# Patient Record
Sex: FEMALE | Race: WHITE | URBAN - METROPOLITAN AREA
[De-identification: names, ages, dates, MRNs, and addresses within clinical notes are randomized per-mention and may not be internally consistent; named-entity substitution may affect disease eponyms.]

---

## 2019-06-27 ENCOUNTER — OFFICE VISIT (OUTPATIENT)
Dept: DERMATOLOGY | Age: 39
End: 2019-06-27

## 2019-06-27 VITALS
WEIGHT: 151 LBS | HEART RATE: 77 BPM | BODY MASS INDEX: 23.7 KG/M2 | OXYGEN SATURATION: 97 % | HEIGHT: 67 IN | DIASTOLIC BLOOD PRESSURE: 74 MMHG | SYSTOLIC BLOOD PRESSURE: 109 MMHG

## 2019-06-27 DIAGNOSIS — L40.9 PSORIASIS: Primary | ICD-10-CM

## 2019-06-27 PROCEDURE — 99202 OFFICE O/P NEW SF 15 MIN: CPT | Performed by: DERMATOLOGY

## 2019-06-27 PROCEDURE — 11104 PUNCH BX SKIN SINGLE LESION: CPT | Performed by: DERMATOLOGY

## 2019-06-27 RX ORDER — FLUCONAZOLE 150 MG/1
150 TABLET ORAL ONCE
COMMUNITY
End: 2019-08-02 | Stop reason: ALTCHOICE

## 2019-06-27 RX ORDER — CLOTRIMAZOLE 1 %
CREAM (GRAM) TOPICAL 2 TIMES DAILY
COMMUNITY
End: 2019-08-02 | Stop reason: ALTCHOICE

## 2019-06-27 RX ORDER — HYDROXYZINE HYDROCHLORIDE 10 MG/1
10 TABLET, FILM COATED ORAL 3 TIMES DAILY PRN
COMMUNITY
End: 2020-04-09 | Stop reason: SDUPTHER

## 2019-06-27 RX ORDER — FLUOXETINE 10 MG/1
10 TABLET, FILM COATED ORAL DAILY
COMMUNITY
End: 2020-04-09 | Stop reason: ALTCHOICE

## 2019-06-27 RX ORDER — PANTOPRAZOLE SODIUM 40 MG/1
40 TABLET, DELAYED RELEASE ORAL DAILY
COMMUNITY
End: 2021-03-26 | Stop reason: ALTCHOICE

## 2019-06-27 RX ORDER — LIDOCAINE HYDROCHLORIDE AND EPINEPHRINE 10; 10 MG/ML; UG/ML
0.5 INJECTION, SOLUTION INFILTRATION; PERINEURAL ONCE
Status: COMPLETED | OUTPATIENT
Start: 2019-06-27 | End: 2019-06-27

## 2019-06-27 RX ADMIN — LIDOCAINE HYDROCHLORIDE AND EPINEPHRINE 0.5 ML: 10; 10 INJECTION, SOLUTION INFILTRATION; PERINEURAL at 14:57

## 2019-06-27 NOTE — PROGRESS NOTES
 Alcohol use: Not on file       REVIEW OF SYSTEMS:  Review of Systems  Skin: Denies any new changing, growing orbleeding lesions or rashes except as described in the HPI   Constitutional: Denies fevers, chills, and malaise. PHYSICAL EXAM:   /74 (Site: Right Upper Arm, Position: Sitting, Cuff Size: Medium Adult)   Pulse 77   Ht 5' 7\" (1.702 m)   Wt 151 lb (68.5 kg)   SpO2 97%   BMI 23.65 kg/m²     General Exam:  General Appearance: No acute distress, Well nourished     Neuro: Alert and oriented to person, place and time  Psych: Normal affect   Lymph Node: Not performed    Cutaneous Exam: Performed as documented in clinic note below. Total body skin exam, including head/face, neck, both arms, chest, back, abdomen, both legs, buttocks, digits and/or nails, was examined. Genital exam was deferred as patient denied having any lesions in this area. Pertinent Physical Exam Findings:  Physical Exam  Annular eroded psoriatic plaques of R>L LE and left arm, 15% BSA    Photo surveillance performed: Yes    Medical Necessity of Exam Performed:   Widespread Rash    Additional Diagnostic Testing performed during exam: Not performed ,  Not performed    ASSESSMENT:   Diagnosis Orders   1. Psoriasis  MS PUNCH BIOPSY SKIN SINGLE LESION    Surgical Pathology    lidocaine-EPINEPHrine 1 percent-[id number] injection 0.5 mL    triamcinolone (KENALOG) 0.1 % ointment       Plan of Action is as Follows:  Assessment   1. Psoriasis vs. Tinea vs. Other annular eruption. Very eroded but I suspect this is d/t recent extensive excoriations, no evidence of infection today. Patient will call if develops s/s of infection  Punch Biopsy: After cleaning with alcohol the rash was anesthetized with 1% lidocaine with epinephrine and a 4 mm punch was performed. Hemostasis was achieved with suture closure of the defect with 4-0 Ethilon and Vaseline and a bandage were applied.   Denies joint pain  - MS PUNCH BIOPSY SKIN SINGLE LESION  - person must inherit the genes that cause it. Types of psoriasis If you have psoriasis, you will have one or more of these types: Plaque (also called psoriasis vulgaris). Guttate. Inverse (also called flexural psoriasis or intertriginous psoriasis). Pustular. Erythrodermic (also called exfoliative psoriasis). Some people get more than one type. Sometimes a person gets one type of psoriasis, and then the type of psoriasis changes. Signs and symptoms:  What you see and feel depends on the type of psoriasis you have. You may have just a few of the signs and symptoms listed below, or you may have many. Plaque psoriasis  (also called psoriasis vulgaris)  Raised, reddish patches on the skin called plaque (plak). Patches may be covered with a silvery-white coating, which dermatologists call scale. Patches can appear anywhere on the skin. Most patches appear on the knees, elbows, lower back, and scalp. Patches can itch. Scratching the itchy patches often causes the patches to thicken. Patches vary in size and can appear as separate patches or join together to cover a large area. Nail problems -- pits in the nails, crumbling nail, nail falls off. What you see and feel depends on the type of psoriasis you have. You may have just a few of the signs and symptoms listed below, or you may have many. Plaque psoriasis (also called psoriasis vulgaris) Raised, reddish patches on the skin called plaque (plak). Patches may be covered with a silvery-white coating, which dermatologists call scale. Patches can appear anywhere on the skin. Most patches appear on the knees, elbows, lower back, and scalp. Patches can itch. Scratching the itchy patches often causes the patches to thicken. Patches vary in size and can appear as separate patches or join together to cover a large area. Nail problems -- pits in the nails, crumbling nail, nail falls off.     Guttate psoriasis  Small, red spots (usually on the trunk, arms, and legs but can appear on the scalp, face, and ears). Spots can show up all over the skin. Spots often appear after an illness, especially strep throat. Spots may clear up in a few weeks or months without treatment. Spots may appear where the person had plaque psoriasis. Pustular psoriasis  Skin red, swollen, and dotted with pus-filled bumps. Bumps usually appear only on the palms and soles. Soreness and pain where the bumps appear. Pus-filled bumps will dry, and leave behind brown dots and/or scale on the skin. When pus-filled bumps cover the body, the person also may have:  Guttate psoriasis Small, red spots (usually on the trunk, arms, and legs but can appear on the scalp, face, and ears). Spots can show up all over the skin. Spots often appear after an illness, especially strep throat. Spots may clear up in a few weeks or months without treatment. Spots may appear where the person had plaque psoriasis. Pustular psoriasis Skin red, swollen, and dotted with pus-filled bumps. Bumps usually appear only on the palms and soles. Soreness and pain where the bumps appear. Pus-filled bumps will dry, and leave behind brown dots and/or scale on the skin. When pus-filled bumps cover the body, the person also may have:  Bright-red skin. Been feeling sick and exhausted. Fever. Chills. Severe itching. Rapid pulse. Loss of appetite. Muscle weakness. Inverse psoriasis   (also called flexural psoriasis or intertriginous psoriasis)Inverse psoriasis (also called flexural psoriasis or intertriginous psoriasis)  Smooth, red patches of skin that look raw. Patches only develop where skin touches skin, such as the armpits, around the groin, genitals, and buttocks. Women can develop a red, raw patch under their breasts. Skin feels very sore where inverse psoriasis appears. Erythrodermic psoriasis  (also called exfoliative psoriasis)  Skin looks like it is burned.    Most (or all) of the skin on the body turns manage the disease, make informed decisions about how you treat psoriasis, and avoid things that can make psoriasis worse. It will also help you talk about psoriasis with others. Take good care of yourself. Eating a healthy diet, exercising, not smoking, and drinking very little alcohol will help. Smoking, drinking, and being overweight make psoriasis worse. These also can make treatment less effective. People who have psoriasis also have an increased risk for developing heart disease, diabetes, and other diseases, so taking good care of yourself is essential.  Be aware of your joints. If your joints feel stiff and sore, especially when you wake up, see a dermatologist. Stiff or sore joints can be the first sign of psoriatic (sore-EE-at-ic) arthritis. About 10% to 30% of people who have psoriasis get this type of arthritis. Treatment is essential. This type of arthritis can eat away the joints. Treatment can prevent deformed joints and disability. Notice your nails. If your nails begin to pull away from the nail bed or develop pitting, ridges, or a yellowish-orange color, see a dermatologist. These are signs of psoriatic arthritis. Pay attention to your mood. If you feel depressed, you may want to join a psoriasis support group or see a mental health professional. Depression, anxiety, and suicidal behavior are more common in people who have psoriasis. Getting help is not a sign of weakness. Learn about treatment for psoriasis. Some people choose not to treat psoriasis, but it is important to know your options. This will help you make an informed decision and feel in control. Tell your dermatologist if you cannot afford the medicine. You may be eligible for financial assistance. To learn more about this assistance, visit Financial Assistance Available for Psoriasis Medication. Talk with your dermatologist before you stop taking medicine for psoriasis.  Immediately stopping a medicine for psoriasis can have serious consequences. It can cause one type of psoriasis to turn into another, more serious type of psoriasis. Lets say a person who has plaque psoriasis takes a medicine called methotrexate. If the person just stops taking methotrexate, this can cause the plaque psoriasis to turn into guttate psoriasis or erythrodermic psoriasis. This can be very serious. Tips for managing Psoriasis:It is a long-lasting disease. Here are some things you can do that will help you take control. Learn about psoriasis. Knowledge really is power. Learning about psoriasis will help you manage the disease, make informed decisions about how you treat psoriasis, and avoid things that can make psoriasis worse. It will also help you talk about psoriasis with others. Take good care of yourself. Eating a healthy diet, exercising, not smoking, and drinking very little alcohol will help. Smoking, drinking, and being overweight make psoriasis worse. These also can make treatment less effective. People who have psoriasis also have an increased risk for developing heart disease, diabetes, and other diseases, so taking good care of yourself is essential. Be aware of your joints. If your joints feel stiff and sore, especially when you wake up, see a dermatologist. Stiff or sore joints can be the first sign of psoriatic (sore-EE-at-ic) arthritis. About 10% to 30% of people who have psoriasis get this type of arthritis. Treatment is essential. This type of arthritis can eat away the joints. Treatment can prevent deformed joints and disability. Notice your nails. If your nails begin to pull away from the nail bed or develop pitting, ridges, or a yellowish-orange color, see a dermatologist. These are signs of psoriatic arthritis. Pay attention to your mood. If you feel depressed, you may want to join a psoriasis support group or see a mental health professional. Depression, anxiety, and suicidal behavior are more common in people who have psoriasis. Getting help is not a sign of weakness. Learn about treatment for psoriasis. Some people choose not to treat psoriasis, but it is important to know your options. This will help you make an informed decision and feel in control. Tell your dermatologist if you cannot afford the medicine. You may be eligible for financial assistance. To learn more about this assistance, visit Financial Assistance Available for Psoriasis Medication. Talk with your dermatologist before you stop taking medicine for psoriasis. Immediately stopping a medicine for psoriasis can have serious consequences. It can cause one type of psoriasis to turn into another, more serious type of psoriasis. Lets say a person who has plaque psoriasis takes a medicine called methotrexate. If the person just stops taking methotrexate, this can cause the plaque psoriasis to turn into guttate psoriasis or erythrodermic psoriasis. This can be very serious. [address] [place] [address] Dermatology                   Follow-up: No follow-ups on file. This note was created with the assistance of a speech-recognition program.  Although the intention is to generate a document that actually reflects the content of the visit, no guarantees can be provided that every mistake has been identified and corrected byediting.     Electronically signed by Milton Mcdowell MD on 6/27/19 at 1:34 PM

## 2019-06-27 NOTE — PATIENT INSTRUCTIONS
1) Apply Triamcinolone cream twice a day. BIOPSY WOUND CARE    A biopsy is where a small piece of skin tissue is removed and examined by a pathologist.  When a biopsy is done, there is a small wound site that requires proper care to prevent infection and scarring. Some biopsies require sutures and their removal.    How to Care for Biopsy Wound    A.  Leave band-aid or dressing on for 24 hours. B. Wash two times a day with soap and water. C.  Let the wound air dry, then apply Vaseline ointment and cover with a Band-Aid       unless otherwise instructed by your provider. D. If there is slight discomfort, you may give acetaminophen or ibuprofen. When To Call the Doctor    Call the Dermatology Clinic or your doctor if any of the following occur:    A. Redness and swelling  B. Tenderness and warm to touch  C.  Drainage from wound  D. Fever    Biopsy Results    Biopsy results are usually available in 1-2 weeks. We provide biopsy results in letters for begin results or we will call for any concerning results. If you have not heard from our staff please call the office within 2 weeks. Please call our office with any concerns at [phone]. Psoriasis    What is psoriasis? Psoriasis (sore-EYE-ah-sis) is a chronic (long-lasting) disease. It develops when a persons immune system sends faulty signals that tell skin cells to grow too quickly. New skin cells form in days rather than weeks. The body does not shed these excess skin cells. The skin cells pile up on the surface of the skin, causing patches of psoriasis to appear. Psoriasis may look contagious, but it's not. You cannot get psoriasis from touching someone who has it. To get psoriasis, a person must inherit the genes that cause it. Types of psoriasis If you have psoriasis, you will have one or more of these types: Plaque (also called psoriasis vulgaris). Guttate. Inverse (also called flexural psoriasis or intertriginous psoriasis). Pustular. Erythrodermic (also called exfoliative psoriasis). Some people get more than one type. Sometimes a person gets one type of psoriasis, and then the type of psoriasis changes. Signs and symptoms:  What you see and feel depends on the type of psoriasis you have. You may have just a few of the signs and symptoms listed below, or you may have many. Plaque psoriasis  (also called psoriasis vulgaris)  Raised, reddish patches on the skin called plaque (plak). Patches may be covered with a silvery-white coating, which dermatologists call scale. Patches can appear anywhere on the skin. Most patches appear on the knees, elbows, lower back, and scalp. Patches can itch. Scratching the itchy patches often causes the patches to thicken. Patches vary in size and can appear as separate patches or join together to cover a large area. Nail problems -- pits in the nails, crumbling nail, nail falls off. What you see and feel depends on the type of psoriasis you have. You may have just a few of the signs and symptoms listed below, or you may have many. Plaque psoriasis (also called psoriasis vulgaris) Raised, reddish patches on the skin called plaque (plak). Patches may be covered with a silvery-white coating, which dermatologists call scale. Patches can appear anywhere on the skin. Most patches appear on the knees, elbows, lower back, and scalp. Patches can itch. Scratching the itchy patches often causes the patches to thicken. Patches vary in size and can appear as separate patches or join together to cover a large area. Nail problems -- pits in the nails, crumbling nail, nail falls off. Guttate psoriasis  Small, red spots (usually on the trunk, arms, and legs but can appear on the scalp, face, and ears). Spots can show up all over the skin. Spots often appear after an illness, especially strep throat. Spots may clear up in a few weeks or months without treatment.    Spots may appear where the person had plaque psoriasis. Pustular psoriasis  Skin red, swollen, and dotted with pus-filled bumps. Bumps usually appear only on the palms and soles. Soreness and pain where the bumps appear. Pus-filled bumps will dry, and leave behind brown dots and/or scale on the skin. When pus-filled bumps cover the body, the person also may have:  Guttate psoriasis Small, red spots (usually on the trunk, arms, and legs but can appear on the scalp, face, and ears). Spots can show up all over the skin. Spots often appear after an illness, especially strep throat. Spots may clear up in a few weeks or months without treatment. Spots may appear where the person had plaque psoriasis. Pustular psoriasis Skin red, swollen, and dotted with pus-filled bumps. Bumps usually appear only on the palms and soles. Soreness and pain where the bumps appear. Pus-filled bumps will dry, and leave behind brown dots and/or scale on the skin. When pus-filled bumps cover the body, the person also may have:  Bright-red skin. Been feeling sick and exhausted. Fever. Chills. Severe itching. Rapid pulse. Loss of appetite. Muscle weakness. Inverse psoriasis   (also called flexural psoriasis or intertriginous psoriasis)Inverse psoriasis (also called flexural psoriasis or intertriginous psoriasis)  Smooth, red patches of skin that look raw. Patches only develop where skin touches skin, such as the armpits, around the groin, genitals, and buttocks. Women can develop a red, raw patch under their breasts. Skin feels very sore where inverse psoriasis appears. Erythrodermic psoriasis  (also called exfoliative psoriasis)  Skin looks like it is burned. Most (or all) of the skin on the body turns bright red. Body cannot maintain its normal temperature of 98.6° F. Person gets very hot or very cold. Heart beats too fast.   Intense itching. Intense pain. Smooth, red patches of skin that look raw.  Patches only develop where skin touches skin, such as the armpits, around the groin, genitals, and buttocks. Women can develop a red, raw patch under their breasts. Skin feels very sore where inverse psoriasis appears. Erythrodermic psoriasis (also called exfoliative psoriasis) Skin looks like it is burned. Most (or all) of the skin on the body turns bright red. Body cannot maintain its normal temperature of 98.6° F. Person gets very hot or very cold. Heart beats too fast. Intense itching. Intense pain. If it looks like a person has erythrodermic psoriasis, get the person to a hospital right away. The persons life may be in danger. Who gets psoriasis? People who get psoriasis usually have one or more person in their family who has psoriasis. Not everyone who has a family member with psoriasis will get psoriasis. But psoriasis is common. In the [place], about 7.5 million people have psoriasis. Most people, about 80%, have plaque psoriasis. Psoriasis can begin at any age. Most people get psoriasis between 13and 27years of age. By age 36, most people who will get psoriasis, about 75%, have psoriasis. Another common time for psoriasis to begin is between 48and 61years of age. Whites get psoriasis more often than other races. Infants and young children are more likely to get inverse psoriasis and guttate psoriasis. What causes psoriasis? Scientists are still trying to learn everything that happens inside the body to cause psoriasis. We know that psoriasis is not contagious. You cannot get psoriasis from swimming in the same pool or having sex. Scientists have learned that a persons immune system and genes play important roles. It seems that many genes must interact to cause psoriasis. Scientists also know that not everyone who inherits the genes for psoriasis will get psoriasis. It seems that a person must inherit the right mix of genes. Then the person must be exposed to a trigger.  Many people say that their psoriasis began after they experienced one of these common psoriasis triggers: A stressful event. Strep throat. Taking certain medicines, such as lithium, or medicine to prevent malaria. Cold, dry weather. A cut, scratch, or bad sunburn. How does a dermatologist diagnose psoriasis? To diagnose psoriasis, a dermatologist:  Examines a patients skin, nails, and scalp for signs of psoriasis. Asks whether family members have psoriasis. Learns about what has been happening in the patients life. A dermatologist may want to know whether a patient has been under a lot of stress, had a recent illness, or just started taking a medicine. Sometimes a dermatologist also removes a bit of skin. A dermatologist may call this confirming the diagnosis. By looking at the removed skin under a microscope, one can confirm whether a person has psoriasis. How do dermatologists treat psoriasis? Treating psoriasis has benefits. Treatment can reduce signs and symptoms of psoriasis, which usually makes a person feel better. With treatment, some people see their skin completely clear. Treatment can even improve a person's quality of life. Thanks to ongoing research, there are many treatments for psoriasis. It is important to work with a dermatologist to find treatment that works for you and fits your lifestyle. Every treatment has benefits, drawbacks, and possible side effects. Outcome  Psoriasis is a chronic (long-lasting) disease of the immune system. It cannot be cured. This means that most people have psoriasis for life. By teaming up with a dermatologist who treats psoriasis, you can find a treatment plan that works for you. Dermatologists encourage their patients who have psoriasis to take an active role in managing this disease. By taking an active role, you can reduce the effects that psoriasis has on your quality of life. How does a dermatologist diagnose psoriasis?  To diagnose psoriasis, a dermatologist: Examines a patients skin, nails, and scalp for signs of psoriasis. Asks whether family members have psoriasis. Learns about what has been happening in the patients life. A dermatologist may want to know whether a patient has been under a lot of stress, had a recent illness, or just started taking a medicine. Sometimes a dermatologist also removes a bit of skin. A dermatologist may call this confirming the diagnosis. By looking at the removed skin under a microscope, one can confirm whether a person has psoriasis. How do dermatologists treat psoriasis? Treating psoriasis has benefits. Treatment can reduce signs and symptoms of psoriasis, which usually makes a person feel better. With treatment, some people see their skin completely clear. Treatment can even improve a person's quality of life. Thanks to ongoing research, there are many treatments for psoriasis. It is important to work with a dermatologist to find treatment that works for you and fits your lifestyle. Every treatment has benefits, drawbacks, and possible side effects. Outcome Psoriasis is a chronic (long-lasting) disease of the immune system. It cannot be cured. This means that most people have psoriasis for life. By teaming up with a dermatologist who treats psoriasis, you can find a treatment plan that works for you. Dermatologists encourage their patients who have psoriasis to take an active role in managing this disease. By taking an active role, you can reduce the effects that psoriasis has on your quality of life. Psoriasis: Tips for managing  Psoriasis is a long-lasting disease. Here are some things you can do that will help you take control. Learn about psoriasis. Knowledge really is power. Learning about psoriasis will help you manage the disease, make informed decisions about how you treat psoriasis, and avoid things that can make psoriasis worse. It will also help you talk about psoriasis with others. Take good care of yourself.  Eating a healthy diet, exercising, not smoking, and plaque psoriasis to turn into guttate psoriasis or erythrodermic psoriasis. This can be very serious. Tips for managing Psoriasis:It is a long-lasting disease. Here are some things you can do that will help you take control. Learn about psoriasis. Knowledge really is power. Learning about psoriasis will help you manage the disease, make informed decisions about how you treat psoriasis, and avoid things that can make psoriasis worse. It will also help you talk about psoriasis with others. Take good care of yourself. Eating a healthy diet, exercising, not smoking, and drinking very little alcohol will help. Smoking, drinking, and being overweight make psoriasis worse. These also can make treatment less effective. People who have psoriasis also have an increased risk for developing heart disease, diabetes, and other diseases, so taking good care of yourself is essential. Be aware of your joints. If your joints feel stiff and sore, especially when you wake up, see a dermatologist. Stiff or sore joints can be the first sign of psoriatic (sore-EE-at-ic) arthritis. About 10% to 30% of people who have psoriasis get this type of arthritis. Treatment is essential. This type of arthritis can eat away the joints. Treatment can prevent deformed joints and disability. Notice your nails. If your nails begin to pull away from the nail bed or develop pitting, ridges, or a yellowish-orange color, see a dermatologist. These are signs of psoriatic arthritis. Pay attention to your mood. If you feel depressed, you may want to join a psoriasis support group or see a mental health professional. Depression, anxiety, and suicidal behavior are more common in people who have psoriasis. Getting help is not a sign of weakness. Learn about treatment for psoriasis. Some people choose not to treat psoriasis, but it is important to know your options. This will help you make an informed decision and feel in control.  Tell your dermatologist if you cannot afford the medicine. You may be eligible for financial assistance. To learn more about this assistance, visit Financial Assistance Available for Psoriasis Medication. Talk with your dermatologist before you stop taking medicine for psoriasis. Immediately stopping a medicine for psoriasis can have serious consequences. It can cause one type of psoriasis to turn into another, more serious type of psoriasis. Lets say a person who has plaque psoriasis takes a medicine called methotrexate. If the person just stops taking methotrexate, this can cause the plaque psoriasis to turn into guttate psoriasis or erythrodermic psoriasis. This can be very serious.           [id number] [employer]

## 2019-07-02 DIAGNOSIS — L40.9 PSORIASIS: ICD-10-CM

## 2019-07-15 ENCOUNTER — TELEPHONE (OUTPATIENT)
Dept: DERMATOLOGY | Age: 39
End: 2019-07-15

## 2019-08-02 ENCOUNTER — OFFICE VISIT (OUTPATIENT)
Dept: DERMATOLOGY | Age: 39
End: 2019-08-02

## 2019-08-02 VITALS
SYSTOLIC BLOOD PRESSURE: 129 MMHG | HEART RATE: 116 BPM | BODY MASS INDEX: 23.78 KG/M2 | DIASTOLIC BLOOD PRESSURE: 92 MMHG | OXYGEN SATURATION: 97 % | HEIGHT: 66 IN | WEIGHT: 148 LBS

## 2019-08-02 DIAGNOSIS — L30.8 OTHER SPECIFIED DERMATITIS: ICD-10-CM

## 2019-08-02 DIAGNOSIS — L30.2 ID REACTION: ICD-10-CM

## 2019-08-02 DIAGNOSIS — L81.0 POST-INFLAMMATORY HYPERPIGMENTATION: Primary | ICD-10-CM

## 2019-08-02 PROCEDURE — 99213 OFFICE O/P EST LOW 20 MIN: CPT | Performed by: DERMATOLOGY

## 2019-08-02 RX ORDER — NORETHINDRONE ACETATE AND ETHINYL ESTRADIOL AND FERROUS FUMARATE 1MG-20(21)
KIT ORAL
Refills: 0 | COMMUNITY
Start: 2019-07-15 | End: 2021-07-09 | Stop reason: SDUPTHER

## 2019-08-02 NOTE — PROGRESS NOTES
Dermatology Patient Note  [facility] PHYS[facility]ANS  [facility]  [address] [address]  Dept: [phone]  Dept Fax: [phone]      VISITDATE: 8/2/2019   REFERRING PROVIDER: No ref. provider found      Linda Cox is a 34 y.o. female  who presents today in the office for:    Follow-up (triamcinolone did help legs, but now she is breaking out on backs of hands and her face)      HISTORY OF PRESENT ILLNESS:  Patients presents for f/u rash, psoriasis vs. eczema  Interval history: legs much improved with triamcinolone, but still have pink patches. Has new bumps on her dorsal hands, but not itchy  Patient assessment of progress: near complete and near adequate  Current treatment and adherence: triamcinolone BID to legs and hands  Prior treatments tried: see initial HPI        CURRENT MEDICATIONS:   Current Outpatient Medications   Medication Sig Dispense Refill    triamcinolone (KENALOG) 0.1 % ointment Apply to rash twice daily (not face, armpit or groin) 454 g 1    FLUoxetine (PROZAC) 10 MG tablet Take 10 mg by mouth daily      hydrOXYzine (ATARAX) 10 MG tablet Take 10 mg by mouth 3 times daily as needed for Itching (anxiety)       pantoprazole (PROTONIX) 40 MG tablet Take 40 mg by mouth daily      JUNEL FE 1/20 1-20 MG-MCG per tablet TAKE 1 TABLET BY MOUTH EVERY DAY  0     No current facility-administered medications for this visit.         ALLERGIES:   Allergies   Allergen Reactions    Erythromycin      As a child        SOCIAL HISTORY:  Social History     Tobacco Use    Smoking status: Current Every Day Smoker     Packs/day: 0.05     Years: 20.00     Pack years: 1.00     Types: Cigarettes    Smokeless tobacco: Never Used    Tobacco comment: down to 1 cig daily   Substance Use Topics    Alcohol use: Not on file       REVIEW OF SYSTEMS:  Review of Systems  Skin: Denies any new changing, growing orbleeding lesions or rashes except as described in the HPI   Constitutional: Denies fevers, chills, and malaise. PHYSICAL EXAM:   BP (!) 129/92 (Site: Left Upper Arm, Position: Sitting, Cuff Size: Medium Adult)   Pulse 116   Ht 5' 6\" (1.676 m)   Wt 148 lb (67.1 kg)   SpO2 97%   BMI 23.89 kg/m²     General Exam:  General Appearance: No acute distress, Well nourished     Neuro: Alert and oriented to person, place and time  Psych: Normal affect   Lymph Node: Not performed    Cutaneous Exam: Performed as documented in clinic note below. Head/face,neck, both arms, digits and/or nails, and limited lower extremities (that which is visible with pants/shorts and shoes/socks on) was examined. Pertinent Physical Exam Findings:  Physical Exam  Legs completely clear with just pink patches remaining  Dorsal hands and digits with pink papules  Face with acneiform papules    Photo surveillance performed: No    Medical Necessity of Exam Performed:   Distribution of patient concerns    Additional Diagnostic Testing performed during exam: Not performed ,  Not performed    ASSESSMENT:   Diagnosis Orders   1. Post-inflammatory hyperpigmentation     2. Id reaction     3. 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 Post-inflammatory hyperpigmentation  - complete clearance of rash on legs with triamcinolone  - biopsy showed spong derm with eos and vesicle formation, consietnt with eczema or contact derm, however morphology and color suggested psoriasis. Patient adamantly denies any new exposure on her legs  - patient will call if recurs    2. Id reaction  - suspect papular eruption on dorsal hands is id  - apply triamcinolone twice daily until clear    RTC prn            Patient Instructions   Use the Triamcinolone on the hands. If the rash comes back on the legs, continue using the Triamcinolone. Call if the rash returns and take a picture.     Sun Protection     There are two types of sun rays that are harmful to the skin. UVA rays cause skin aging and skin cancer, such as melanoma. UVB rays cause sunburns, cataracts, and also contribute to skin cancer. The American-Academy of Dermatology recommends that children and adults wear a broad spectrum, waterproof sunscreen with a Sun Protection Factor (SPF) of 30 or higher. It is important to check the ingredient label to be sure the sunscreen will protect the skin from both UVA and UVB sunrays. Your sunscreen should contain at least one of the following ingredients: titanium dioxide, zinc oxide, or avobenzone. Sunscreen will not be effective unless it is applied to all exposed skin. Sunscreens work best if they are applied 30 minutes before sun exposure. They should be reapplied every 2 hours and after any water exposure. Sunscreen is not perfect. It is important to use other methods to protect the skin from sun exposure also. Wear hats, sunglasses and other sun protective clothing when outdoors. Stay in the shade during the peak hours of sun exposure between 10 AM and 4 PM.        Follow-up: No follow-ups on file. This note was created with the assistance of a speech-recognition program.  Although the intention is to generate a document that actually reflects the content of the visit, no guarantees can be provided that every mistake has been identified and corrected byediting.     Electronically signed by Javier Suazo MD on 8/2/19 at 2:41 PM

## 2019-08-07 ENCOUNTER — TELEPHONE (OUTPATIENT)
Dept: DERMATOLOGY | Age: 39
End: 2019-08-07

## 2020-04-09 ENCOUNTER — OFFICE VISIT (OUTPATIENT)
Dept: FAMILY MEDICINE CLINIC | Age: 40
End: 2020-04-09

## 2020-04-09 VITALS
BODY MASS INDEX: 26.21 KG/M2 | HEIGHT: 67 IN | DIASTOLIC BLOOD PRESSURE: 84 MMHG | SYSTOLIC BLOOD PRESSURE: 120 MMHG | TEMPERATURE: 98.1 F | OXYGEN SATURATION: 97 % | HEART RATE: 78 BPM | WEIGHT: 167 LBS

## 2020-04-09 PROBLEM — F32.9 MAJOR DEPRESSIVE DISORDER WITH CURRENT ACTIVE EPISODE: Status: ACTIVE | Noted: 2020-04-09

## 2020-04-09 PROBLEM — L30.9 ECZEMA: Status: ACTIVE | Noted: 2020-04-09

## 2020-04-09 PROBLEM — F41.9 ANXIETY: Status: ACTIVE | Noted: 2020-04-09

## 2020-04-09 PROBLEM — A60.00 RECURRENT GENITAL HERPES SIMPLEX TYPE 2 INFECTION: Status: ACTIVE | Noted: 2020-04-09

## 2020-04-09 PROBLEM — G47.00 INSOMNIA: Status: ACTIVE | Noted: 2020-04-09

## 2020-04-09 PROBLEM — Z87.11 HISTORY OF GASTRIC ULCER: Status: ACTIVE | Noted: 2020-04-09

## 2020-04-09 PROCEDURE — G0444 DEPRESSION SCREEN ANNUAL: HCPCS | Performed by: NURSE PRACTITIONER

## 2020-04-09 PROCEDURE — G8431 POS CLIN DEPRES SCRN F/U DOC: HCPCS | Performed by: NURSE PRACTITIONER

## 2020-04-09 PROCEDURE — 99204 OFFICE O/P NEW MOD 45 MIN: CPT | Performed by: NURSE PRACTITIONER

## 2020-04-09 RX ORDER — VENLAFAXINE HYDROCHLORIDE 150 MG/1
150 CAPSULE, EXTENDED RELEASE ORAL DAILY
Qty: 90 CAPSULE | Refills: 1 | Status: SHIPPED | OUTPATIENT
Start: 2020-04-09 | End: 2020-10-26

## 2020-04-09 RX ORDER — ALPRAZOLAM 0.5 MG/1
0.5 TABLET ORAL DAILY
COMMUNITY
End: 2020-04-09 | Stop reason: ALTCHOICE

## 2020-04-09 RX ORDER — HYDROXYZINE 50 MG/1
50 TABLET, FILM COATED ORAL NIGHTLY
Qty: 30 TABLET | Refills: 0 | Status: SHIPPED | OUTPATIENT
Start: 2020-04-09 | End: 2020-04-09

## 2020-04-09 RX ORDER — ZOLPIDEM TARTRATE 12.5 MG/1
12.5 TABLET, FILM COATED, EXTENDED RELEASE ORAL NIGHTLY PRN
COMMUNITY
End: 2020-04-09 | Stop reason: ALTCHOICE

## 2020-04-09 RX ORDER — HYDROXYZINE HYDROCHLORIDE 10 MG/1
50 TABLET, FILM COATED ORAL NIGHTLY PRN
Qty: 90 TABLET | Refills: 1 | Status: SHIPPED
Start: 2020-04-09 | End: 2020-04-09 | Stop reason: CLARIF

## 2020-04-09 RX ORDER — HYDROXYZINE 50 MG/1
50 TABLET, FILM COATED ORAL NIGHTLY
Qty: 90 TABLET | Refills: 1 | Status: SHIPPED | OUTPATIENT
Start: 2020-04-09 | End: 2020-10-14

## 2020-04-09 RX ORDER — VALACYCLOVIR HYDROCHLORIDE 1 G/1
1000 TABLET, FILM COATED ORAL DAILY
Qty: 30 TABLET | Refills: 1 | Status: SHIPPED | OUTPATIENT
Start: 2020-04-09 | End: 2020-06-19

## 2020-04-09 RX ORDER — VENLAFAXINE HYDROCHLORIDE 75 MG/1
75 CAPSULE, EXTENDED RELEASE ORAL DAILY
COMMUNITY
End: 2020-04-09 | Stop reason: SDUPTHER

## 2020-04-09 RX ORDER — VALACYCLOVIR HYDROCHLORIDE 500 MG/1
500 TABLET, FILM COATED ORAL DAILY
COMMUNITY
End: 2020-04-09

## 2020-04-09 RX ORDER — BUSPIRONE HYDROCHLORIDE 5 MG/1
5 TABLET ORAL 3 TIMES DAILY
Qty: 90 TABLET | Refills: 0 | Status: SHIPPED | OUTPATIENT
Start: 2020-04-09 | End: 2020-08-04

## 2020-04-09 ASSESSMENT — ENCOUNTER SYMPTOMS
SORE THROAT: 0
DIARRHEA: 0
SHORTNESS OF BREATH: 0
COUGH: 0
RHINORRHEA: 0
ABDOMINAL DISTENTION: 0
BACK PAIN: 0
NAUSEA: 0
VOMITING: 0
ABDOMINAL PAIN: 0
CHEST TIGHTNESS: 0
CONSTIPATION: 0

## 2020-04-09 ASSESSMENT — PATIENT HEALTH QUESTIONNAIRE - PHQ9
SUM OF ALL RESPONSES TO PHQ QUESTIONS 1-9: 14
8. MOVING OR SPEAKING SO SLOWLY THAT OTHER PEOPLE COULD HAVE NOTICED. OR THE OPPOSITE, BEING SO FIGETY OR RESTLESS THAT YOU HAVE BEEN MOVING AROUND A LOT MORE THAN USUAL: 0
SUM OF ALL RESPONSES TO PHQ9 QUESTIONS 1 & 2: 3
4. FEELING TIRED OR HAVING LITTLE ENERGY: 3
1. LITTLE INTEREST OR PLEASURE IN DOING THINGS: 1
10. IF YOU CHECKED OFF ANY PROBLEMS, HOW DIFFICULT HAVE THESE PROBLEMS MADE IT FOR YOU TO DO YOUR WORK, TAKE CARE OF THINGS AT HOME, OR GET ALONG WITH OTHER PEOPLE: 1
6. FEELING BAD ABOUT YOURSELF - OR THAT YOU ARE A FAILURE OR HAVE LET YOURSELF OR YOUR FAMILY DOWN: 0
2. FEELING DOWN, DEPRESSED OR HOPELESS: 2
3. TROUBLE FALLING OR STAYING ASLEEP: 3
SUM OF ALL RESPONSES TO PHQ QUESTIONS 1-9: 14
9. THOUGHTS THAT YOU WOULD BE BETTER OFF DEAD, OR OF HURTING YOURSELF: 0
5. POOR APPETITE OR OVEREATING: 2
7. TROUBLE CONCENTRATING ON THINGS, SUCH AS READING THE NEWSPAPER OR WATCHING TELEVISION: 3

## 2020-04-09 NOTE — PROGRESS NOTES
Amor Dent, APRN-CNP  [facility]  [address] [address], [address]  [address] [address]  Dept: [phone]  Dept Fax: [phone]       Patient ID: Doc Ramirez is a 37 y.o. female. HPI    Doc Ramirez is a 37 y.o. female who presents to the office today for a first visit and to establish a relationship with a new primary care physician. Today, the patient complains of increased anxiety and depression. She recently moved here from [place] to be with her family in [place]. 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 Pt denies any fever or chills. Pt today denies any HA, chest pain, or SOB. Pt denies any N/V/D/C or abdominal pain. She does have a history of eczema. She presents today with a generalized itchy rash. She did see dermatologist Angie Ibrahim in June of 2019 for similar complaints. Per patient, her last physician thought she had scabies. She was treated multiple times and it never went away. Upon presentation to the dermatologist, she underwent a biopsy which was compatible with an eczematous process. She denies any major health concerns. The patient's past medical, surgical, social, and family history as well as her current medications and allergies were reviewed as documented in today's encounter. Current Outpatient Medications on File Prior to Visit   Medication Sig Dispense Refill    JUNEL FE 1/20 1-20 MG-MCG per tablet TAKE 1 TABLET BY MOUTH EVERY DAY  0    pantoprazole (PROTONIX) 40 MG tablet Take 40 mg by mouth daily       No current facility-administered medications on file prior to visit. Subjective:     Preventative care, female:  Last colonoscopy:  No   Last mammogram: no   Sexually active: no   Contraception: no    Last PAP:  No, been a couple of years.     Nicotine use: vapes  Alcohol use: socially  Drug use:  no      Review of Systems

## 2020-06-19 RX ORDER — VALACYCLOVIR HYDROCHLORIDE 1 G/1
TABLET, FILM COATED ORAL
Qty: 30 TABLET | Refills: 0 | Status: SHIPPED | OUTPATIENT
Start: 2020-06-19 | End: 2020-08-04

## 2020-08-04 RX ORDER — VALACYCLOVIR HYDROCHLORIDE 1 G/1
TABLET, FILM COATED ORAL
Qty: 90 TABLET | Refills: 0 | Status: SHIPPED | OUTPATIENT
Start: 2020-08-04 | End: 2020-09-24

## 2020-08-04 RX ORDER — BUSPIRONE HYDROCHLORIDE 5 MG/1
TABLET ORAL
Qty: 90 TABLET | Refills: 0 | Status: SHIPPED | OUTPATIENT
Start: 2020-08-04 | End: 2021-03-26 | Stop reason: ALTCHOICE

## 2020-09-24 RX ORDER — VALACYCLOVIR HYDROCHLORIDE 1 G/1
TABLET, FILM COATED ORAL
Qty: 90 TABLET | Refills: 0 | Status: SHIPPED | OUTPATIENT
Start: 2020-09-24 | End: 2021-03-22

## 2020-10-26 RX ORDER — VENLAFAXINE HYDROCHLORIDE 150 MG/1
CAPSULE, EXTENDED RELEASE ORAL
Qty: 30 CAPSULE | Refills: 0 | Status: SHIPPED | OUTPATIENT
Start: 2020-10-26 | End: 2020-11-20

## 2020-11-20 RX ORDER — VENLAFAXINE HYDROCHLORIDE 150 MG/1
CAPSULE, EXTENDED RELEASE ORAL
Qty: 90 CAPSULE | Refills: 3 | Status: SHIPPED | OUTPATIENT
Start: 2020-11-20 | End: 2021-08-20

## 2021-01-07 LAB
ALBUMIN SERPL-MCNC: 4.3 G/DL
ALP BLD-CCNC: 72 U/L
ALT SERPL-CCNC: 16 U/L
ANION GAP SERPL CALCULATED.3IONS-SCNC: NORMAL MMOL/L
AST SERPL-CCNC: 24 U/L
AVERAGE GLUCOSE: 105
BASOPHILS ABSOLUTE: NORMAL
BASOPHILS RELATIVE PERCENT: NORMAL
BILIRUB SERPL-MCNC: 0.3 MG/DL (ref 0.1–1.4)
BUN BLDV-MCNC: 7 MG/DL
CALCIUM SERPL-MCNC: 8.8 MG/DL
CHLORIDE BLD-SCNC: 104 MMOL/L
CHOLESTEROL, TOTAL: 124 MG/DL
CHOLESTEROL/HDL RATIO: 2.8
CO2: 24 MMOL/L
CREAT SERPL-MCNC: 0.61 MG/DL
EOSINOPHILS ABSOLUTE: NORMAL
EOSINOPHILS RELATIVE PERCENT: NORMAL
GFR CALCULATED: NORMAL
GLUCOSE BLD-MCNC: 106 MG/DL
HBA1C MFR BLD: 5.3 %
HCT VFR BLD CALC: 39 % (ref 36–46)
HDLC SERPL-MCNC: 45 MG/DL (ref 35–70)
HEMOGLOBIN: 13 G/DL (ref 12–16)
LDL CHOLESTEROL CALCULATED: 56 MG/DL (ref 0–160)
LYMPHOCYTES ABSOLUTE: NORMAL
LYMPHOCYTES RELATIVE PERCENT: NORMAL
MCH RBC QN AUTO: NORMAL PG
MCHC RBC AUTO-ENTMCNC: NORMAL G/DL
MCV RBC AUTO: NORMAL FL
MONOCYTES ABSOLUTE: NORMAL
MONOCYTES RELATIVE PERCENT: NORMAL
NEUTROPHILS ABSOLUTE: NORMAL
NEUTROPHILS RELATIVE PERCENT: NORMAL
NONHDLC SERPL-MCNC: NORMAL MG/DL
PLATELET # BLD: 346 K/ΜL
PMV BLD AUTO: NORMAL FL
POTASSIUM SERPL-SCNC: 4 MMOL/L
RBC # BLD: 4.21 10^6/ΜL
SODIUM BLD-SCNC: 139 MMOL/L
TOTAL PROTEIN: 6.9
TRIGL SERPL-MCNC: 117 MG/DL
VLDLC SERPL CALC-MCNC: 23 MG/DL
WBC # BLD: 5.04 10^3/ML

## 2021-01-13 DIAGNOSIS — Z00.00 ANNUAL VISIT FOR GENERAL ADULT MEDICAL EXAMINATION WITHOUT ABNORMAL FINDINGS: ICD-10-CM

## 2021-03-06 DIAGNOSIS — G47.00 INSOMNIA, UNSPECIFIED TYPE: ICD-10-CM

## 2021-03-08 RX ORDER — HYDROXYZINE 50 MG/1
TABLET, FILM COATED ORAL
Qty: 30 TABLET | Refills: 0 | Status: SHIPPED | OUTPATIENT
Start: 2021-03-08 | End: 2021-03-26 | Stop reason: ALTCHOICE

## 2021-03-22 DIAGNOSIS — A60.00 RECURRENT GENITAL HERPES SIMPLEX TYPE 2 INFECTION: ICD-10-CM

## 2021-03-22 RX ORDER — VALACYCLOVIR HYDROCHLORIDE 1 G/1
TABLET, FILM COATED ORAL
Qty: 30 TABLET | Refills: 0 | Status: SHIPPED | OUTPATIENT
Start: 2021-03-22 | End: 2021-07-09 | Stop reason: SDUPTHER

## 2021-03-22 NOTE — TELEPHONE ENCOUNTER
Last 4/9/20  Next None     Patient had an appointment with Dr Marton Galeazzi on 12/2/20 and no showed the appointment.

## 2021-08-20 ENCOUNTER — OFFICE VISIT (OUTPATIENT)
Dept: FAMILY MEDICINE CLINIC | Age: 41
End: 2021-08-20

## 2021-08-20 VITALS
OXYGEN SATURATION: 97 % | TEMPERATURE: 97.3 F | SYSTOLIC BLOOD PRESSURE: 118 MMHG | DIASTOLIC BLOOD PRESSURE: 86 MMHG | HEART RATE: 102 BPM | BODY MASS INDEX: 25.37 KG/M2 | WEIGHT: 162 LBS

## 2021-08-20 DIAGNOSIS — E78.5 DYSLIPIDEMIA: ICD-10-CM

## 2021-08-20 DIAGNOSIS — R73.9 HYPERGLYCEMIA: ICD-10-CM

## 2021-08-20 DIAGNOSIS — Z87.891 PERSONAL HISTORY OF TOBACCO USE, PRESENTING HAZARDS TO HEALTH: ICD-10-CM

## 2021-08-20 DIAGNOSIS — F40.10 SOCIAL ANXIETY DISORDER: ICD-10-CM

## 2021-08-20 DIAGNOSIS — F41.9 ANXIETY: ICD-10-CM

## 2021-08-20 DIAGNOSIS — Z71.3 DIETARY COUNSELING: ICD-10-CM

## 2021-08-20 DIAGNOSIS — Z59.86: ICD-10-CM

## 2021-08-20 DIAGNOSIS — Z76.89 ESTABLISHING CARE WITH NEW DOCTOR, ENCOUNTER FOR: Primary | ICD-10-CM

## 2021-08-20 PROCEDURE — G8417 CALC BMI ABV UP PARAM F/U: HCPCS | Performed by: FAMILY MEDICINE

## 2021-08-20 PROCEDURE — 99204 OFFICE O/P NEW MOD 45 MIN: CPT | Performed by: FAMILY MEDICINE

## 2021-08-20 PROCEDURE — 99406 BEHAV CHNG SMOKING 3-10 MIN: CPT | Performed by: FAMILY MEDICINE

## 2021-08-20 RX ORDER — MIRTAZAPINE 15 MG/1
15 TABLET, FILM COATED ORAL NIGHTLY
Qty: 30 TABLET | Refills: 1 | Status: SHIPPED | OUTPATIENT
Start: 2021-08-20 | End: 2021-10-29

## 2021-08-20 RX ORDER — PROPRANOLOL HYDROCHLORIDE 10 MG/1
10 TABLET ORAL 4 TIMES DAILY
Qty: 120 TABLET | Refills: 1 | Status: SHIPPED | OUTPATIENT
Start: 2021-08-20 | End: 2021-10-29 | Stop reason: SDUPTHER

## 2021-08-20 SDOH — ECONOMIC STABILITY: FOOD INSECURITY: WITHIN THE PAST 12 MONTHS, THE FOOD YOU BOUGHT JUST DIDN'T LAST AND YOU DIDN'T HAVE MONEY TO GET MORE.: NEVER TRUE

## 2021-08-20 SDOH — ECONOMIC STABILITY - INCOME SECURITY: FINANCIAL INSECURITY: Z59.86

## 2021-08-20 SDOH — ECONOMIC STABILITY: FOOD INSECURITY: WITHIN THE PAST 12 MONTHS, YOU WORRIED THAT YOUR FOOD WOULD RUN OUT BEFORE YOU GOT MONEY TO BUY MORE.: NEVER TRUE

## 2021-08-20 ASSESSMENT — PATIENT HEALTH QUESTIONNAIRE - PHQ9
SUM OF ALL RESPONSES TO PHQ QUESTIONS 1-9: 0
SUM OF ALL RESPONSES TO PHQ QUESTIONS 1-9: 0
2. FEELING DOWN, DEPRESSED OR HOPELESS: 0
SUM OF ALL RESPONSES TO PHQ9 QUESTIONS 1 & 2: 0
SUM OF ALL RESPONSES TO PHQ QUESTIONS 1-9: 0
1. LITTLE INTEREST OR PLEASURE IN DOING THINGS: 0

## 2021-08-20 ASSESSMENT — SOCIAL DETERMINANTS OF HEALTH (SDOH): HOW HARD IS IT FOR YOU TO PAY FOR THE VERY BASICS LIKE FOOD, HOUSING, MEDICAL CARE, AND HEATING?: NOT HARD AT ALL

## 2021-08-20 NOTE — PROGRESS NOTES
Progress Note    Parish Valdez is a 41 y.o.  female who presents today alone for evaluation of   Chief Complaint   Patient presents with    New Patient    Anxiety           HPI:   Patient is here to [facility] care today. Patient PMH GERD, HSV, mood disorder, and anxiety. Patient PSH wisdom tooth extraction. Patient fhx mom DM, dad prostate cancer, and brother DM. Patient is a current everyday tobacco smoker. Patient denies regular EtOH consumption. Patient denies illicits. Patient admits to some depression and anxiety. Patient states she has been bothered by her mood for the last ten years. Patient denies specific diet. Patient denies regular aerobic activity weekly. Patient denies prior mammogram. Patient is due for PAP. Patient states she will go to [facility].     PHQ-9 Total Score: 0 (8/20/2021  2:31 PM)    Patient states she has suffered from anxiety and panic attacks. Patient states effexor does help somewhat. Patient states she gets very anxious and gets shaky at work. Patient states she has difficulty falling asleep. Patient states she is able to stay asleep. Patient reports excessive sweating. Patient is a tobacco smoker.     Health Maintenance Due   Topic Date Due    Pneumococcal 0-64 years Vaccine (1 of 2 - PPSV23) Never done    COVID-19 Vaccine (1) Never done    Cervical cancer screen  Never done        Current Medications:     Current Outpatient Medications   Medication Sig Dispense Refill    mirtazapine (REMERON) 15 MG tablet Take 1 tablet by mouth nightly 30 tablet 1    propranolol (INDERAL) 10 MG tablet Take 1 tablet by mouth 4 times daily 120 tablet 1    JUNEL FE 1/20 1-20 MG-MCG per tablet TAKE 1 TABLET BY MOUTH EVERY DAY 28 tablet 11    valACYclovir (VALTREX) 1 g tablet Take 1 tablet by mouth daily 30 tablet 11    venlafaxine (EFFEXOR XR) 150 MG extended release capsule Take 2 capsules by mouth daily 60 capsule 11    pantoprazole (PROTONIX) 40 MG tablet Take 1 tablet by mouth every morning (before breakfast) 30 tablet 11     No current facility-administered medications for this visit. Allergies: Allergies   Allergen Reactions    Erythromycin      As a child     Red Dye         Medical History:     Past Medical History:   Diagnosis Date    Anxiety     Depression     Eczema     Herpes     Stomach ulcer        Past Surgical History:   Procedure Laterality Date    WISDOM TOOTH EXTRACTION         Family History   Problem Relation Age of Onset    Diabetes Mother     Cancer Father         prostate    Diabetes Brother         Social History:     Social History     Socioeconomic History    Marital status: Single     Spouse name: Not on file    Number of children: Not on file    Years of education: Not on file    Highest education level: Not on file   Occupational History    Not on file   Tobacco Use    Smoking status: Current Every Day Smoker     Packs/day: 0.25     Years: 20.00     Pack years: 5.00     Types: Cigarettes     Start date: 4/9/1996    Smokeless tobacco: Never Used    Tobacco comment: down to 3 cig daily   Vaping Use    Vaping Use: Former    Start date: 4/9/2020    Substances: Unknown   Substance and Sexual Activity    Alcohol use: Yes     Comment: Socially     Drug use: Never    Sexual activity: Not Currently   Other Topics Concern    Not on file   Social History Narrative    Not on file     Social Determinants of Health     Financial Resource Strain: Low Risk     Difficulty of Paying Living Expenses: Not hard at all   Food Insecurity: No Food Insecurity    Worried About Running Out of Food in the Last Year: Never true    Maik of Food in the Last Year: Never true   Transportation Needs:     Lack of Transportation (Medical):      Lack of Transportation (Non-Medical):    Physical Activity:     Days of Exercise per Week:     Minutes of Exercise per Session:    Stress:     Feeling of Stress :    Social Connections:     Frequency of Communication with Friends and Family:     Frequency of Social Gatherings with Friends and Family:     Attends Hindu Services:     Active Member of Clubs or Organizations:     Attends Club or Organization Meetings:     Marital Status:    Intimate Partner Violence:     Fear of Current or Ex-Partner:     Emotionally Abused:     Physically Abused:     Sexually Abused:         ROS:     Constitutional: No fevers, chills, fatigue. +anxiety/panic  ENT: No nasal congestion or sore throat  Respiratory: No difficulty in breathing or cough. Cardiovascular: No chest pain, palpitations or shortness of breath  Gastrointestinal: No abdominal pain or change in bowel movements. Genitourinary: No change in urinary frequency or dysuria. Skin: No rashes or skin lesions. +excessive sweating  Neurological: No weakness. No headaches. Last Filed Vitals:  /86 (Site: Left Upper Arm, Position: Sitting, Cuff Size: Medium Adult)   Pulse 102   Temp 97.3 °F (36.3 °C) (Temporal)   Wt 162 lb (73.5 kg)   SpO2 97%   BMI 25.37 kg/m²      Physical Examination:     GENERAL APPEARANCE: in no acute distress, well developed, well nourished. HEAD: normocephalic, atraumatic. EYES: extraocular movement intact (EOMI), pupils equal, round, reactive to light and accommodation. EARS: normal, tympanic membrane intact, clear, auditory canal clear. NOSE: nares patent, no erythema, sinuses nontender bilaterally, no rhinorrhea. ORAL CAVITY: mucosa moist, no lesions. THROAT: clear, no mass, no exudate. NECK/THYROID: neck supple, full range of motion, no thyromegaly. HEART: no murmurs, regular rate and rhythm, S1, S2 normal.   LUNGS: clear to auscultation bilaterally, no wheezes, rales, rhonchi.    ABDOMEN: normal, bowel sounds present, soft, nontender, nondistended, no rebound guarding or rigidity    Recent Labs/ In Office Testing/ Radiograph review:     Orders Only on 01/13/2021   Component Date Value Ref Range Status    Cholesterol, Total 01/07/2021 124  mg/dL Final    HDL 01/07/2021 45  35 - 70 mg/dL Final    LDL Calculated 01/07/2021 56  0 - 160 mg/dL Final    Triglycerides 01/07/2021 117  mg/dL Final    Chol/HDL Ratio 01/07/2021 2.8   Final    VLDL 01/07/2021 23  mg/dL Final    Sodium 01/07/2021 139  mmol/L Final    Chloride 01/07/2021 104  mmol/L Final    Potassium 01/07/2021 4.0  mmol/L Final    BUN 01/07/2021 7  mg/dL Final    CREATININE 01/07/2021 0.61   Final    Glucose 01/07/2021 106  mg/dL Final    AST 01/07/2021 24  U/L Final    ALT 01/07/2021 16  U/L Final    Calcium 01/07/2021 8.8  mg/dL Final    Total Protein 01/07/2021 6.9   Final    CO2 01/07/2021 24  mmol/L Final    Albumin 01/07/2021 4.3   Final    Alkaline Phosphatase 01/07/2021 72  U/L Final    Total Bilirubin 01/07/2021 0.3  0.1 - 1.4 mg/dL Final    WBC 01/07/2021 5.04  10^3/mL Final    Hemoglobin 01/07/2021 13.0  12.0 - 16.0 g/dL Final    Hematocrit 01/07/2021 39.0  36 - 46 % Final    Platelets 66/29/7817 346  K/µL Final    RBC 01/07/2021 4.21  10^6/µL Final    Hemoglobin A1C 01/07/2021 5.3  % Final    AVERAGE GLUCOSE 01/07/2021 105   Final       No results found for this visit on 08/20/21. Assessment/Plan:     Baljinder Jewell was seen today for new patient and anxiety. Diagnoses and all orders for this visit:    Establishing care with new doctor, encounter for    BMI 25.0-25.9,adult    Dyslipidemia  -     ALT; Future  -     AST; Future  -     CBC Auto Differential; Future  -     Lipid Panel; Future  -     Magnesium; Future  -     Renal Function Panel; Future  -     TSH with Reflex; Future    Hyperglycemia  -     Hemoglobin A1C; Future    Anxiety  -     TSH with Reflex; Future  -     mirtazapine (REMERON) 15 MG tablet; Take 1 tablet by mouth nightly    Unable to afford copayment for visit  -     [facility] Referral for Social Determinants of Health    Social anxiety disorder  -     propranolol (INDERAL) 10 MG tablet;  Take 1 tablet by mouth 4 times daily    Dietary counseling  -      BMI ABOVE NORMAL F/U    Personal history of tobacco use, presenting hazards to health  -     NE TOBACCO USE CESSATION INTERMEDIATE 3-10 MINUTES [04715]    Follow up on labs. [facility] referral placed for self pay and help with mammogram copay. Strongly encouraged smoking cessation. Encouraged well balanced diet. Encouraged 150 mins of aerobic activity weekly. Start remeron at bedtime for sleep and anxiety. Start low dose B-blocker for sweating, shaking, and anxiety at work. All questions answered and addressed to patient satisfaction. Patient understands and agrees to the plan. The patient was evaluated and treated today based on the osteopathic principle that each person is a unit of body, mind, and spirit, the body is capable of self-regulation, self-healing, and health maintenance and that structure and function are reciprocally interrelated. Follow-up:   Return in about 6 weeks (around 10/1/2021) for anxiety; 20 min appt. Beulah Barnhart D.O.    BMI was elevated today, and weight loss plan recommended is : conventional weight loss. Tobacco Cessation Counseling: Patient advised about behavior change, including information about personal health harms, usage of appropriate cessation measures and benefits of cessation.   Time spent (minutes): 3-10

## 2021-08-20 NOTE — PATIENT INSTRUCTIONS
Learning About Obesity  What is obesity? Obesity means having an unhealthy amount of body fat. This puts your health in danger. It can lead to other health problems, such as type 2 diabetes and high blood pressure. How do you know if your weight is in the obesity range? To know if your weight is in the obesity range, your doctor looks at your body mass index (BMI) and waist size. BMI is a number that is calculated from your weight and your height. To figure out your BMI for yourself, you can use an online tool, such as http://www.askew.com/ on the [employer]. If your BMI is 30.0 or higher, it falls within the obesity range. Keep in mind that BMI and waist size are only guides. They are not tools to determine your ideal body weight. What causes obesity? When you take in more calories than you burn off, you gain weight. How you eat, how active you are, and other things affect how your body uses calories and whether you gain weight. If you have family members who have too much body fat, you may have inherited a tendency to gain weight. And your family also helps form your eating and lifestyle habits, which can lead to obesity. Also, our busy lives make it harder to plan and cook healthy meals. For many of us, it's easier to reach for prepared foods, go out to eat, or go to the drive-through. But these foods are often high in saturated fat and calories. Portions are often too large. What can you do to reach a healthy weight? Focus on health, not diets. Diets are hard to stay on and don't work in the long run. It is very hard to stay with a diet that includes lots of big changes in your eating habits. Instead of a diet, focus on lifestyle changes that will improve your health and achieve the right balance of energy and calories. To lose weight, you need to burn more calories than you take in.  You can do it by eating healthy foods in reasonable amounts and becoming more active, even a little bit every day. Making small changes over time can add up to a lot. Make a plan for change. Many people have found that naming their reasons for change and staying focused on their plan can make a big difference. Work with your doctor to create a plan that is right for you. · Ask yourself: Ivelisse Squibb are my personal, most powerful reasons for wanting this change? What will my life look like when I've made the change? \"  · Set your long-term goal. Make it specific, such as \"I will lose x pounds. \"  · Break your long-term goal into smaller, short-term goals. Make these small steps specific and within your reach, things you know you can do. These steps are what keep you going from day to day. Talk with your doctor about other weight-loss options. If you have a BMI in a certain range and have not been able to lose weight with diet and exercise, medicine or surgery may be an option for you. Before your doctor will prescribe medicines or surgery, he or she will probably want you to be more active and follow your healthy eating plan for a period of time. These habits are key lifelong changes for managing your weight, with or without other medical treatment. And these changes can help you avoid weight-related health problems. How can you stay on your plan for change? Be ready. Choose to start during a time when there are few events like holidays, social events, and high-stress periods. These events might trigger slip-ups. Decide on your first few steps. Most people have more success when they make small changes, one step at a time. For example, you might switch a daily candy bar to a piece of fruit, walk 10 minutes more, or add more vegetables to a meal.  Line up your support people. Make sure you're not going to be alone as you make this change. Connect with people who understand how important it is to you.  Ask family members and friends for help in keeping with your plan. And think about who could make it harder for you, and how to handle them. Try tracking. People who keep track of what they eat, feel, and do are better at losing weight. Try writing down things like:  · What and how much you eat. · How you feel before and after each meal.  · Details about each meal (like eating out or at home, eating alone, or with friends or family). · What you do to be active. Look and plan. As you track, look for patterns that you may want to change. Take note of:  · When you eat and whether you skip meals. · How often you eat out. · How many fruits and vegetables you eat. · When you eat beyond feeling full. · When and why you eat for reasons other than being hungry. When you stray from your plan, don't get upset. Figure out what made you slip up and how you can fix it. Can you take medicines or have surgery to lose weight? If you have a BMI in a certain range and have not been able to lose weight with diet and exercise, medicine or surgery may be an option for you. If you have a BMI of at least 30.0 (or a BMI of at least 27.0 and another health problem related to your weight), ask your doctor about weight-loss medicines. They work by making you feel less hungry, making you feel full more quickly, or changing how you digest fat. Medicines are used along with diet changes and more physical activity to help you make lasting changes. If you have a BMI of 40.0 or more (or a BMI of 35.0 or more and another health problem related to your weight), your doctor may talk with you about surgery. Weight-loss surgery has risks, and you will need to work with your doctor to compare the risk of having obesity with the risks of surgery. With any option you choose, you will still need to eat a healthy diet and get regular exercise. Follow-up care is a key part of your treatment and safety. Be sure to make and go to all appointments, and call your doctor if you are having problems. It's also a good idea to know your test results and keep a list of the medicines you take. Where can you learn more? Go to https://chpepiceweb.[email or web address] org and sign in to your whoplusyou account. Enter N111 in the [facility] box to learn more about \"Learning About Obesity. \"     If you do not have an account, please click on the \"Sign Up Now\" link. Current as of: March 17, 2021               Content Version: 12.9  © 2407-7148 [employer]. Care instructions adapted under license by [facility] ([facility]). If you have questions about a medical condition or this instruction, always ask your healthcare professional. [address] any warranty or liability for your use of this information. Stopping Smoking: Care Instructions  Your Care Instructions     Cigarette smokers crave the nicotine in cigarettes. Giving it up is much harder than simply changing a habit. Your body has to stop craving the nicotine. It is hard to quit, but you can do it. There are many tools that people use to quit smoking. You may find that combining tools works best for you. There are several steps to quitting. First you get ready to quit. Then you get support to help you. After that, you learn new skills and behaviors to become a nonsmoker. For many people, a necessary step is getting and using medicine. Your doctor will help you set up the plan that best meets your needs. You may want to attend a smoking cessation program to help you quit smoking. When you choose a program, look for one that has proven success. Ask your doctor for ideas. You will greatly increase your chances of success if you take medicine as well as get counseling or join a cessation program.  Some of the changes you feel when you first quit tobacco are uncomfortable. Your body will miss the nicotine at first, and you may feel short-tempered and grumpy. You may have trouble sleeping or concentrating.  Medicine can help you deal with these symptoms. You may struggle with changing your smoking habits and rituals. The last step is the tricky one: Be prepared for the smoking urge to continue for a time. This is a lot to deal with, but keep at it. You will feel better. Follow-up care is a key part of your treatment and safety. Be sure to make and go to all appointments, and call your doctor if you are having problems. It's also a good idea to know your test results and keep a list of the medicines you take. How can you care for yourself at home? · Ask your family, friends, and coworkers for support. You have a better chance of quitting if you have help and support. · Join a support group, such as Nicotine Anonymous, for people who are trying to quit smoking. · Consider signing up for a smoking cessation program, such as the American Lung Association's Freedom from Smoking program.  · Get text messaging support. Go to the website at www.smokefree. gov to sign up for the [facility] program.  · Set a quit date. Pick your date carefully so that it is not right in the middle of a big deadline or stressful time. Once you quit, do not even take a puff. Get rid of all ashtrays and lighters after your last cigarette. Clean your house and your clothes so that they do not smell of smoke. · Learn how to be a nonsmoker. Think about ways you can avoid those things that make you reach for a cigarette. ? Avoid situations that put you at greatest risk for smoking. For some people, it is hard to have a drink with friends without smoking. For others, they might skip a coffee break with coworkers who smoke. ? Change your daily routine. Take a different route to work or eat a meal in a different place. · Cut down on stress. Calm yourself or release tension by doing an activity you enjoy, such as reading a book, taking a hot bath, or gardening.   · Talk to your doctor or pharmacist about nicotine replacement therapy, which replaces the nicotine in your body. You still get nicotine but you do not use tobacco. Nicotine replacement products help you slowly reduce the amount of nicotine you need. These products come in several forms, many of them available over-the-counter:  ? Nicotine patches  ? Nicotine gum and lozenges  ? Nicotine inhaler  · Ask your doctor about bupropion (Wellbutrin) or varenicline (Chantix), which are prescription medicines. They do not contain nicotine. They help you by reducing withdrawal symptoms, such as stress and anxiety. · Some people find hypnosis, acupuncture, and massage helpful for ending the smoking habit. · Eat a healthy diet and get regular exercise. Having healthy habits will help your body move past its craving for nicotine. · Be prepared to keep trying. Most people are not successful the first few times they try to quit. Do not get mad at yourself if you smoke again. Make a list of things you learned and think about when you want to try again, such as next week, next month, or next year. Where can you learn more? Go to https://Spotlight.fm.[employer]. org and sign in to your [employer] account. Enter O733 in the [employer] box to learn more about \"Stopping Smoking: Care Instructions. \"     If you do not have an account, please click on the \"Sign Up Now\" link. Current as of: February 11, 2021               Content Version: 12.9  © 8793-7395 Healthwise, Incorporated. Care instructions adapted under license by [facility] [address] If you have questions about a medical condition or this instruction, always ask your healthcare professional. [place] [address] any warranty or liability for your use of this information. Learning About Benefits From Quitting Smoking  How does quitting smoking make you healthier? If you're thinking about quitting smoking, you may have a few reasons to be smoke-free. Your health may be one of them.   · When you quit smoking, you lower your risks for cancer, lung disease, heart attack, stroke, blood vessel disease, and blindness from macular degeneration. · When you're smoke-free, you get sick less often, and you heal faster. You are less likely to get colds, flu, bronchitis, and pneumonia. · As a nonsmoker, you may find that your mood is better and you are less stressed. When and how will you feel healthier? Quitting has real health benefits that start from day 1 of being smoke-free. And the longer you stay smoke-free, the healthier you get and the better you feel. The first hours  · After just 20 minutes, your blood pressure and heart rate go down. That means there's less stress on your heart and blood vessels. · Within 12 hours, the level of carbon monoxide in your blood drops back to normal. That makes room for more oxygen. With more oxygen in your body, you may notice that you have more energy than when you smoked. After 2 weeks  · Your lungs start to work better. · Your risk of heart attack starts to drop. After 1 month  · When your lungs are clear, you cough less and breathe deeper, so it's easier to be active. · Your sense of taste and smell return. That means you can enjoy food more than you have since you started smoking. Over the years  · Over the years, your risks of heart disease, heart attack, and stroke are lower. · After 10 years, your risk of dying from lung cancer is cut by about half. And your risk for many other types of cancer is lower too. How would quitting help others in your life? When you quit smoking, you improve the health of everyone who now breathes in your smoke. · Their heart, lung, and cancer risks drop, much like yours. · They are sick less. For babies and small children, living smoke-free means they're less likely to have ear infections, pneumonia, and bronchitis. · If you're a woman who is or will be pregnant someday, quitting smoking means a healthier .   · Children who are close to you are less likely to become adult smokers. Where can you learn more? Go to https://chpepiceweb.healthAptible. org and sign in to your [employer] account. Enter [id number] [id number] [id number] in the [employer] box to learn more about \"Learning About Benefits From Quitting Smoking. \"     If you do not have an account, please click on the \"Sign Up Now\" link. Current as of: February 11, 2021               Content Version: 12.9  © 2006-2021 Healthwise, Incorporated. Care instructions adapted under license by [facility] [place] ([facility]). If you have questions about a medical condition or this instruction, always ask your healthcare professional. [place] [address] any warranty or liability for your use of this information.

## 2021-08-27 ENCOUNTER — TELEPHONE (OUTPATIENT)
Dept: FAMILY MEDICINE CLINIC | Age: 41
End: 2021-08-27

## 2021-08-31 NOTE — TELEPHONE ENCOUNTER
Patient agreeable to receiving community resource assistance, will call back in a few hours because she is at work. Has no health insurance.

## 2021-10-29 ENCOUNTER — OFFICE VISIT (OUTPATIENT)
Dept: FAMILY MEDICINE CLINIC | Age: 41
End: 2021-10-29

## 2021-10-29 VITALS
HEART RATE: 91 BPM | SYSTOLIC BLOOD PRESSURE: 110 MMHG | BODY MASS INDEX: 26 KG/M2 | WEIGHT: 166 LBS | OXYGEN SATURATION: 97 % | DIASTOLIC BLOOD PRESSURE: 74 MMHG

## 2021-10-29 DIAGNOSIS — F40.10 SOCIAL ANXIETY DISORDER: ICD-10-CM

## 2021-10-29 DIAGNOSIS — F41.9 ANXIETY: Primary | ICD-10-CM

## 2021-10-29 DIAGNOSIS — Z71.3 DIETARY COUNSELING: ICD-10-CM

## 2021-10-29 DIAGNOSIS — Z87.891 PERSONAL HISTORY OF TOBACCO USE, PRESENTING HAZARDS TO HEALTH: ICD-10-CM

## 2021-10-29 PROCEDURE — 99214 OFFICE O/P EST MOD 30 MIN: CPT | Performed by: FAMILY MEDICINE

## 2021-10-29 PROCEDURE — 99406 BEHAV CHNG SMOKING 3-10 MIN: CPT | Performed by: FAMILY MEDICINE

## 2021-10-29 PROCEDURE — G8417 CALC BMI ABV UP PARAM F/U: HCPCS | Performed by: FAMILY MEDICINE

## 2021-10-29 RX ORDER — PROPRANOLOL HYDROCHLORIDE 10 MG/1
10 TABLET ORAL 4 TIMES DAILY
Qty: 360 TABLET | Refills: 1 | Status: SHIPPED | OUTPATIENT
Start: 2021-10-29

## 2021-10-29 RX ORDER — MIRTAZAPINE 30 MG/1
30 TABLET, FILM COATED ORAL NIGHTLY
Qty: 90 TABLET | Refills: 0 | Status: SHIPPED | OUTPATIENT
Start: 2021-10-29 | End: 2022-04-13

## 2021-10-29 RX ORDER — VENLAFAXINE HYDROCHLORIDE 150 MG/1
300 CAPSULE, EXTENDED RELEASE ORAL DAILY
Qty: 180 CAPSULE | Refills: 1 | Status: SHIPPED | OUTPATIENT
Start: 2021-10-29 | End: 2022-05-09

## 2021-10-29 NOTE — PATIENT INSTRUCTIONS
Learning About Obesity  What is obesity? Obesity means having an unhealthy amount of body fat. This puts your health in danger. It can lead to other health problems, such as type 2 diabetes and high blood pressure. How do you know if your weight is in the obesity range? To know if your weight is in the obesity range, your doctor looks at your body mass index (BMI) and waist size. BMI is a number that is calculated from your weight and your height. To figure out your BMI for yourself, you can use an online tool, such as http://www.askew.com/ on the [employer] of [employer]. If your BMI is 30.0 or higher, it falls within the obesity range. Keep in mind that BMI and waist size are only guides. They are not tools to determine your ideal body weight. What causes obesity? When you take in more calories than you burn off, you gain weight. How you eat, how active you are, and other things affect how your body uses calories and whether you gain weight. If you have family members who have too much body fat, you may have inherited a tendency to gain weight. And your family also helps form your eating and lifestyle habits, which can lead to obesity. Also, our busy lives make it harder to plan and cook healthy meals. For many of us, it's easier to reach for prepared foods, go out to eat, or go to the drive-through. But these foods are often high in saturated fat and calories. Portions are often too large. What can you do to reach a healthy weight? Focus on health, not diets. Diets are hard to stay on and don't work in the long run. It is very hard to stay with a diet that includes lots of big changes in your eating habits. Instead of a diet, focus on lifestyle changes that will improve your health and achieve the right balance of energy and calories. To lose weight, you need to burn more calories than you take in.  You can do it by eating healthy foods in reasonable amounts and becoming more active, even a little bit every day. Making small changes over time can add up to a lot. Make a plan for change. Many people have found that naming their reasons for change and staying focused on their plan can make a big difference. Work with your doctor to create a plan that is right for you. · Ask yourself: Greta Spray are my personal, most powerful reasons for wanting this change? What will my life look like when I've made the change? \"  · Set your long-term goal. Make it specific, such as \"I will lose x pounds. \"  · Break your long-term goal into smaller, short-term goals. Make these small steps specific and within your reach, things you know you can do. These steps are what keep you going from day to day. Talk with your doctor about other weight-loss options. If you have a BMI in a certain range and have not been able to lose weight with diet and exercise, medicine or surgery may be an option for you. Before your doctor will prescribe medicines or surgery, he or she will probably want you to be more active and follow your healthy eating plan for a period of time. These habits are key lifelong changes for managing your weight, with or without other medical treatment. And these changes can help you avoid weight-related health problems. How can you stay on your plan for change? Be ready. Choose to start during a time when there are few events like holidays, social events, and high-stress periods. These events might trigger slip-ups. Decide on your first few steps. Most people have more success when they make small changes, one step at a time. For example, you might switch a daily candy bar to a piece of fruit, walk 10 minutes more, or add more vegetables to a meal.  Line up your support people. Make sure you're not going to be alone as you make this change. Connect with people who understand how important it is to you.  Ask family members and friends for help in keeping with your plan. And think about who could make it harder for you, and how to handle them. Try tracking. People who keep track of what they eat, feel, and do are better at losing weight. Try writing down things like:  · What and how much you eat. · How you feel before and after each meal.  · Details about each meal (like eating out or at home, eating alone, or with friends or family). · What you do to be active. Look and plan. As you track, look for patterns that you may want to change. Take note of:  · When you eat and whether you skip meals. · How often you eat out. · How many fruits and vegetables you eat. · When you eat beyond feeling full. · When and why you eat for reasons other than being hungry. When you stray from your plan, don't get upset. Figure out what made you slip up and how you can fix it. Can you take medicines or have surgery to lose weight? If you have a BMI in a certain range and have not been able to lose weight with diet and exercise, medicine or surgery may be an option for you. If you have a BMI of at least 30.0 (or a BMI of at least 27.0 and another health problem related to your weight), ask your doctor about weight-loss medicines. They work by making you feel less hungry, making you feel full more quickly, or changing how you digest fat. Medicines are used along with diet changes and more physical activity to help you make lasting changes. If you have a BMI of 40.0 or more (or a BMI of 35.0 or more and another health problem related to your weight), your doctor may talk with you about surgery. Weight-loss surgery has risks, and you will need to work with your doctor to compare the risk of having obesity with the risks of surgery. With any option you choose, you will still need to eat a healthy diet and get regular exercise. Follow-up care is a key part of your treatment and safety. Be sure to make and go to all appointments, and call your doctor if you are having problems. It's also a good idea to know your test results and keep a list of the medicines you take. Where can you learn more? Go to https://chpepiceweb.[email or web address]. org and sign in to your Sara Campbell account. Enter N111 in the [facility] box to learn more about \"Learning About Obesity. \"     If you do not have an account, please click on the \"Sign Up Now\" link. Current as of: March 17, 2021               Content Version: 13.0  © 5299-3431 [employer]. Care instructions adapted under license by [facility] ([facility]). If you have questions about a medical condition or this instruction, always ask your healthcare professional. [address] any warranty or liability for your use of this information. Stopping Smoking: Care Instructions  Your Care Instructions     Cigarette smokers crave the nicotine in cigarettes. Giving it up is much harder than simply changing a habit. Your body has to stop craving the nicotine. It is hard to quit, but you can do it. There are many tools that people use to quit smoking. You may find that combining tools works best for you. There are several steps to quitting. First you get ready to quit. Then you get support to help you. After that, you learn new skills and behaviors to become a nonsmoker. For many people, a necessary step is getting and using medicine. Your doctor will help you set up the plan that best meets your needs. You may want to attend a smoking cessation program to help you quit smoking. When you choose a program, look for one that has proven success. Ask your doctor for ideas. You will greatly increase your chances of success if you take medicine as well as get counseling or join a cessation program.  Some of the changes you feel when you first quit tobacco are uncomfortable. Your body will miss the nicotine at first, and you may feel short-tempered and grumpy. You may have trouble sleeping or concentrating.  Medicine can help you deal with these symptoms. You may struggle with changing your smoking habits and rituals. The last step is the tricky one: Be prepared for the smoking urge to continue for a time. This is a lot to deal with, but keep at it. You will feel better. Follow-up care is a key part of your treatment and safety. Be sure to make and go to all appointments, and call your doctor if you are having problems. It's also a good idea to know your test results and keep a list of the medicines you take. How can you care for yourself at home? · Ask your family, friends, and coworkers for support. You have a better chance of quitting if you have help and support. · Join a support group, such as Nicotine Anonymous, for people who are trying to quit smoking. · Consider signing up for a smoking cessation program, such as the American Lung Association's Freedom from Smoking program.  · Get text messaging support. Go to the website at www.smokefree. gov to sign up for the [facility] program.  · Set a quit date. Pick your date carefully so that it is not right in the middle of a big deadline or stressful time. Once you quit, do not even take a puff. Get rid of all ashtrays and lighters after your last cigarette. Clean your house and your clothes so that they do not smell of smoke. · Learn how to be a nonsmoker. Think about ways you can avoid those things that make you reach for a cigarette. ? Avoid situations that put you at greatest risk for smoking. For some people, it is hard to have a drink with friends without smoking. For others, they might skip a coffee break with coworkers who smoke. ? Change your daily routine. Take a different route to work or eat a meal in a different place. · Cut down on stress. Calm yourself or release tension by doing an activity you enjoy, such as reading a book, taking a hot bath, or gardening.   · Talk to your doctor or pharmacist about nicotine replacement therapy, which replaces the nicotine in your body. You still get nicotine but you do not use tobacco. Nicotine replacement products help you slowly reduce the amount of nicotine you need. These products come in several forms, many of them available over-the-counter:  ? Nicotine patches  ? Nicotine gum and lozenges  ? Nicotine inhaler  · Ask your doctor about bupropion (Wellbutrin) or varenicline (Chantix), which are prescription medicines. They do not contain nicotine. They help you by reducing withdrawal symptoms, such as stress and anxiety. · Some people find hypnosis, acupuncture, and massage helpful for ending the smoking habit. · Eat a healthy diet and get regular exercise. Having healthy habits will help your body move past its craving for nicotine. · Be prepared to keep trying. Most people are not successful the first few times they try to quit. Do not get mad at yourself if you smoke again. Make a list of things you learned and think about when you want to try again, such as next week, next month, or next year. Where can you learn more? Go to https://[email or web address].Celsion. org and sign in to your [employer] account. Enter X693 in the [employer] box to learn more about \"Stopping Smoking: Care Instructions. \"     If you do not have an account, please click on the \"Sign Up Now\" link. Current as of: February 11, 2021               Content Version: 13.0  © 3652-1618 Healthwise, Incorporated. Care instructions adapted under license by [facility] ([facility]). If you have questions about a medical condition or this instruction, always ask your healthcare professional. [place] [address] any warranty or liability for your use of this information. Learning About Benefits From Quitting Smoking  How does quitting smoking make you healthier? If you're thinking about quitting smoking, you may have a few reasons to be smoke-free. Your health may be one of them.   · When you quit smoking, you lower your risks for cancer, lung disease, heart attack, stroke, blood vessel disease, and blindness from macular degeneration. · When you're smoke-free, you get sick less often, and you heal faster. You are less likely to get colds, flu, bronchitis, and pneumonia. · As a nonsmoker, you may find that your mood is better and you are less stressed. When and how will you feel healthier? Quitting has real health benefits that start from day 1 of being smoke-free. And the longer you stay smoke-free, the healthier you get and the better you feel. The first hours  · After just 20 minutes, your blood pressure and heart rate go down. That means there's less stress on your heart and blood vessels. · Within 12 hours, the level of carbon monoxide in your blood drops back to normal. That makes room for more oxygen. With more oxygen in your body, you may notice that you have more energy than when you smoked. After 2 weeks  · Your lungs start to work better. · Your risk of heart attack starts to drop. After 1 month  · When your lungs are clear, you cough less and breathe deeper, so it's easier to be active. · Your sense of taste and smell return. That means you can enjoy food more than you have since you started smoking. Over the years  · Over the years, your risks of heart disease, heart attack, and stroke are lower. · After 10 years, your risk of dying from lung cancer is cut by about half. And your risk for many other types of cancer is lower too. How would quitting help others in your life? When you quit smoking, you improve the health of everyone who now breathes in your smoke. · Their heart, lung, and cancer risks drop, much like yours. · They are sick less. For babies and small children, living smoke-free means they're less likely to have ear infections, pneumonia, and bronchitis. · If you're a woman who is or will be pregnant someday, quitting smoking means a healthier .   · Children who are close to you are less likely to become adult smokers. Where can you learn more? Go to https://chpepiceweb.healthPoptip. org and sign in to your [employer] account. Enter [id number] in the [employer] box to learn more about \"Learning About Benefits From Quitting Smoking. \"     If you do not have an account, please click on the \"Sign Up Now\" link. Current as of: February 11, 2021               Content Version: 13.0  © 7804-9809 Healthwise, Incorporated. Care instructions adapted under license by [facility] ([facility]). If you have questions about a medical condition or this instruction, always ask your healthcare professional. [place] [address] any warranty or liability for your use of this information.

## 2021-10-29 NOTE — PROGRESS NOTES
Progress Note    Darshana Olguin is a 35 y.o.  female who presents today alone for evaluation of   Chief Complaint   Patient presents with    Anxiety           HPI:   Patient is here for follow up on anxiety. Patient was started on remeron for anxiety at last visit. Patient was also provided propranolol at last visit for social anxiety as needed. Patient is a tobacco smoker. Patient states propranolol is helping with social anxiety and tremor. Patient states remeron is helping her sleep. Patient states she has not noticed a significant difference in her mood. Patient denies SI/HI. Health Maintenance Due   Topic Date Due    Pneumococcal 0-64 years Vaccine (1 of 2 - PPSV23) Never done    COVID-19 Vaccine (1) Never done    Cervical cancer screen  Never done        Current Medications:     Current Outpatient Medications   Medication Sig Dispense Refill    venlafaxine (EFFEXOR XR) 150 MG extended release capsule Take 2 capsules by mouth daily 180 capsule 1    propranolol (INDERAL) 10 MG tablet Take 1 tablet by mouth 4 times daily 360 tablet 1    mirtazapine (REMERON) 30 MG tablet Take 1 tablet by mouth nightly 90 tablet 0    JUNEL FE 1/20 1-20 MG-MCG per tablet TAKE 1 TABLET BY MOUTH EVERY DAY 28 tablet 11    valACYclovir (VALTREX) 1 g tablet Take 1 tablet by mouth daily 30 tablet 11    pantoprazole (PROTONIX) 40 MG tablet Take 1 tablet by mouth every morning (before breakfast) 30 tablet 11     No current facility-administered medications for this visit. Allergies:      Allergies   Allergen Reactions    Erythromycin      As a child     Red Dye         Medical History:     Past Medical History:   Diagnosis Date    Anxiety     Depression     Eczema     Herpes     Stomach ulcer        Past Surgical History:   Procedure Laterality Date    WISDOM TOOTH EXTRACTION         Family History   Problem Relation Age of Onset    Diabetes Mother     Cancer Father         prostate    Diabetes Brother         Social History:     Social History     Socioeconomic History    Marital status: Single     Spouse name: Not on file    Number of children: Not on file    Years of education: Not on file    Highest education level: Not on file   Occupational History    Not on file   Tobacco Use    Smoking status: Current Every Day Smoker     Packs/day: 0.25     Years: 20.00     Pack years: 5.00     Types: Cigarettes     Start date: 4/9/1996    Smokeless tobacco: Never Used    Tobacco comment: down to 3 cig daily   Vaping Use    Vaping Use: Former    Start date: 4/9/2020    Substances: Unknown   Substance and Sexual Activity    Alcohol use: Yes     Comment: Socially     Drug use: Never    Sexual activity: Not Currently   Other Topics Concern    Not on file   Social History Narrative    Not on file     Social Determinants of Health     Financial Resource Strain: Low Risk     Difficulty of Paying Living Expenses: Not hard at all   Food Insecurity: No Food Insecurity    Worried About [address] in the Last Year: Never true    [facility] in the Last Year: Never true   Transportation Needs:     Lack of Transportation (Medical):  Lack of Transportation (Non-Medical):    Physical Activity:     Days of Exercise per Week:     Minutes of Exercise per Session:    Stress:     Feeling of Stress :    Social Connections:     Frequency of Communication with Friends and Family:     Frequency of Social Gatherings with Friends and Family:     Attends Scientology Services:     Active Member of Clubs or Organizations:     Attends Club or Organization Meetings:     Marital Status:    Intimate Partner Violence:     Fear of Current or Ex-Partner:     Emotionally Abused:     Physically Abused:     Sexually Abused:         ROS:     Constitutional: No fevers, chills, fatigue. +anxiety  ENT: No nasal congestion or sore throat  Respiratory: No difficulty in breathing or cough.    Cardiovascular: No  Albumin 01/07/2021 4.3   Final    Alkaline Phosphatase 01/07/2021 72  U/L Final    Total Bilirubin 01/07/2021 0.3  0.1 - 1.4 mg/dL Final    WBC 01/07/2021 5.04  10^3/mL Final    Hemoglobin 01/07/2021 13.0  12.0 - 16.0 g/dL Final    Hematocrit 01/07/2021 39.0  36 - 46 % Final    Platelets 99/30/5253 346  K/µL Final    RBC 01/07/2021 4.21  10^6/µL Final    Hemoglobin A1C 01/07/2021 5.3  % Final    AVERAGE GLUCOSE 01/07/2021 105   Final       No results found for this visit on 10/29/21. Assessment/Plan:     Pio Mccain was seen today for anxiety. Diagnoses and all orders for this visit:    Anxiety  -     venlafaxine (EFFEXOR XR) 150 MG extended release capsule; Take 2 capsules by mouth daily  -     mirtazapine (REMERON) 30 MG tablet; Take 1 tablet by mouth nightly    Social anxiety disorder  -     propranolol (INDERAL) 10 MG tablet; Take 1 tablet by mouth 4 times daily    BMI 25.0-25.9,adult    Dietary counseling  -      BMI ABOVE NORMAL F/U    Personal history of tobacco use, presenting hazards to health  -     LA TOBACCO USE CESSATION INTERMEDIATE 3-10 MINUTES [61376]    Refill as above. Increase remeron to 30 mg nightly to help improve mood. Strongly encouraged smoking cessation. All questions answered and addressed to patient satisfaction. Patient understands and agrees to the plan. The patient was evaluated and treated today based on the osteopathic principle that each person is a unit of body, mind, and spirit, the body is capable of self-regulation, self-healing, and health maintenance and that structure and function are reciprocally interrelated. Follow-up:   Return in about 3 months (around 1/29/2022) for mood; 20 min appt. Francisco Foreman D.O.    BMI was elevated today, and weight loss plan recommended is : conventional weight loss.     Tobacco Cessation Counseling: Patient advised about behavior change, including information about personal health harms, usage of appropriate cessation measures and benefits of cessation.   Time spent (minutes): 3-10

## 2022-02-04 ENCOUNTER — TELEPHONE (OUTPATIENT)
Dept: FAMILY MEDICINE CLINIC | Age: 42
End: 2022-02-04

## 2022-02-04 NOTE — TELEPHONE ENCOUNTER
----- Message from Babar Krishnan sent at 2/4/2022  1:14 PM EST -----  Subject: Message to Provider    QUESTIONS  Information for Provider? Patient has follow up that was rescheduled from   02/04/2022 to 04/01/2022, please review if there is an earlier appt to   rescheduled for a Friday,   ---------------------------------------------------------------------------  --------------  CALL BACK INFO  What is the best way for the office to contact you? OK to leave message on   voicemail  Preferred Call Back Phone Number? [phone]  ---------------------------------------------------------------------------  --------------  SCRIPT ANSWERS  Relationship to Patient?  Self

## 2022-03-08 NOTE — TELEPHONE ENCOUNTER
Problem: Pain (Non-Labor and/or Postpartum)  Goal: Acceptable pain/ comfort level is achieved/maintained at rest and with activity without oversedation (based on self-reporting using NRS / Faces)  Outcome: Outcome Met, Continue evaluating goal progress toward completion  Goal: Verbalizes understanding of pain management  Description: Document on Patient Education Activity  Outcome: Outcome Met, Continue evaluating goal progress toward completion  Goal: Verbalizes understanding of effective use of Patient Controlled Analgesia (PCA)  Description: Document on Patient Education Activity  Outcome: Outcome Met, Continue evaluating goal progress toward completion     Problem: Postpartum  Goal: Demonstrates progression toward physical  / emotional / psychosocial postpartum recovery  Outcome: Outcome Met, Continue evaluating goal progress toward completion  Goal: Demonstrates positive reciprocal attachment with infant  Outcome: Outcome Met, Continue evaluating goal progress toward completion  Goal: Verbalizes understanding of normal physical and emotional alterations associated with puerperium  Description: Document on Patient Education Activity  Outcome: Outcome Met, Continue evaluating goal progress toward completion  Goal: Begins to establish milk supply to meet personal breastfeeding goals  Outcome: Outcome Met, Continue evaluating goal progress toward completion      Last appointment 04/9/20    Had scheduled a new patient appointment with Dr. Dawson Delgado on 12/02/20 and no showed the appointment.

## 2022-04-12 DIAGNOSIS — F41.9 ANXIETY: ICD-10-CM

## 2022-04-13 RX ORDER — MIRTAZAPINE 30 MG/1
TABLET, FILM COATED ORAL
Qty: 30 TABLET | Refills: 0 | Status: SHIPPED | OUTPATIENT
Start: 2022-04-13 | End: 2022-05-27 | Stop reason: SDUPTHER

## 2022-04-13 NOTE — TELEPHONE ENCOUNTER
[facility] [place] is calling to request a refill on the following medication(s):    Medication Request:  Requested Prescriptions     Pending Prescriptions Disp Refills    mirtazapine (REMERON) 30 MG tablet [Pharmacy Med Name: MIRTAZAPINE 30 MG TABLET] 30 tablet 0     Sig: TAKE ONE TABLET BY MOUTH ONCE NIGHTLY       Last Visit Date (If Applicable):  04/34/5013    Next Visit Date:    Patient getting established with another practice 05/06/22

## 2022-05-08 DIAGNOSIS — F41.9 ANXIETY: ICD-10-CM

## 2022-05-09 RX ORDER — VENLAFAXINE HYDROCHLORIDE 150 MG/1
CAPSULE, EXTENDED RELEASE ORAL
Qty: 180 CAPSULE | Refills: 0 | Status: SHIPPED | OUTPATIENT
Start: 2022-05-09 | End: 2022-08-16 | Stop reason: SDUPTHER

## 2022-05-09 NOTE — TELEPHONE ENCOUNTER
[facility] [place] is calling to request a refill on the following medication(s):    Medication Request:  Requested Prescriptions     Pending Prescriptions Disp Refills    venlafaxine (EFFEXOR XR) 150 MG extended release capsule [Pharmacy Med Name: VENLAFAXINE HCL  MG CAP] 180 capsule 1     Sig: TAKE TWO CAPSULES BY MOUTH DAILY       Last Visit Date (If Applicable):  24/47/3918    Next Visit Date:    Visit date not found

## 2022-06-09 ENCOUNTER — HOSPITAL ENCOUNTER (OUTPATIENT)
Dept: MAMMOGRAPHY | Age: 42
Discharge: HOME | End: 2022-06-11

## 2022-06-09 DIAGNOSIS — Z12.31 BREAST CANCER SCREENING BY MAMMOGRAM: ICD-10-CM

## 2022-06-09 PROCEDURE — 77063 BREAST TOMOSYNTHESIS BI: CPT

## 2022-08-12 DIAGNOSIS — F41.9 ANXIETY: ICD-10-CM

## 2022-08-12 RX ORDER — VENLAFAXINE HYDROCHLORIDE 150 MG/1
CAPSULE, EXTENDED RELEASE ORAL
Qty: 180 CAPSULE | Refills: 0 | OUTPATIENT
Start: 2022-08-12

## 2023-03-07 ENCOUNTER — OFFICE VISIT (OUTPATIENT)
Dept: OBGYN CLINIC | Age: 43
End: 2023-03-07

## 2023-03-07 ENCOUNTER — HOSPITAL ENCOUNTER (OUTPATIENT)
Age: 43
Setting detail: SPECIMEN
Discharge: HOME | End: 2023-03-07

## 2023-03-07 VITALS
SYSTOLIC BLOOD PRESSURE: 114 MMHG | WEIGHT: 163 LBS | DIASTOLIC BLOOD PRESSURE: 82 MMHG | BODY MASS INDEX: 25.58 KG/M2 | HEIGHT: 67 IN

## 2023-03-07 DIAGNOSIS — Z12.31 ENCOUNTER FOR SCREENING MAMMOGRAM FOR MALIGNANT NEOPLASM OF BREAST: ICD-10-CM

## 2023-03-07 DIAGNOSIS — Z11.51 SCREENING FOR HUMAN PAPILLOMAVIRUS (HPV): ICD-10-CM

## 2023-03-07 DIAGNOSIS — Z01.419 WELL WOMAN EXAM WITH ROUTINE GYNECOLOGICAL EXAM: Primary | ICD-10-CM

## 2023-03-07 DIAGNOSIS — N95.1 PERIMENOPAUSAL SYMPTOMS: ICD-10-CM

## 2023-03-07 DIAGNOSIS — Z01.419 VISIT FOR GYNECOLOGIC EXAMINATION: ICD-10-CM

## 2023-03-07 PROCEDURE — 99396 PREV VISIT EST AGE 40-64: CPT | Performed by: OBSTETRICS & GYNECOLOGY

## 2023-03-07 NOTE — PROGRESS NOTES
History and Physical  [address], [address] [facility] [address], [address]. [phone]   Fax [phone]  Maribel Westbrook  3/7/2023              38 y.o. Chief Complaint   Patient presents with    Annual Exam       No LMP recorded. (Menstrual status: Other - See Notes). Primary Care Physician: Lakisha Green PA-C    The patient was seen and examined. She has no chief complaint today and is here for her annual exam.  Her bowels are regular. There are no voiding complaints. She denies any bloating. She denies vaginal discharge and was counseled on STD's and the need for barrier contraception. HPI : Maribel Wsetbrook is a 38 y.o. female     Pt presents to office for annual exam. Pt without c/c. Pt is on junel. Pt is not sexually active currently. Pt has had 1 SAB. Pt does not have any children and does not desire children. Pt has not had a pap smear for > 10 years. Pt had a mammogram 1 year ago which was normal. Pt does not have insurance so will do mammogram when month for breast cancer awareness comes. Pt wishes to change to progesterone only pills.    no Bloating  no Early Satiety  no Unexplained weight change of more than 15 lbs  no  PMB  no  PCB  ________________________________________________________________________  OB History    Para Term  AB Living   0 0 0 0 0 0   SAB IAB Ectopic Molar Multiple Live Births   0 0 0 0 0 0     Past Medical History:   Diagnosis Date    Anxiety     Depression     Eczema     Herpes     Stomach ulcer                                                                    Past Surgical History:   Procedure Laterality Date    WISDOM TOOTH EXTRACTION       Family History   Problem Relation Age of Onset    Cancer Father         prostate    Diabetes Mother     Diabetes Brother      Social History     Socioeconomic History    Marital status: Single     Spouse name: Not on file    Number of children: Not on file Years of education: Not on file    Highest education level: Not on file   Occupational History    Not on file   Tobacco Use    Smoking status: Former     Packs/day: 0.25     Years: 20.00     Pack years: 5.00     Types: Cigarettes     Start date: 1996     Quit date: 2021     Years since quittin.2    Smokeless tobacco: Never    Tobacco comments:     down to 3 cig daily   Vaping Use    Vaping Use: Former    Start date: 2020    Substances: Unknown   Substance and Sexual Activity    Alcohol use: Yes     Comment: Socially     Drug use: Never    Sexual activity: Not Currently   Other Topics Concern    Not on file   Social History Narrative    Not on file     Social Determinants of Health     Financial Resource Strain: Low Risk     Difficulty of Paying Living Expenses: Not hard at all   Food Insecurity: No Food Insecurity    Worried About Running Out of Food in the Last Year: Never true    [address] in the Last Year: Never true   Transportation Needs: No Transportation Needs    Lack of Transportation (Medical): No    Lack of Transportation (Non-Medical):  No   Physical Activity: Not on file   Stress: Not on file   Social Connections: Not on file   Intimate Partner Violence: Not on file   Housing Stability: Not on file       MEDICATIONS:  Current Outpatient Medications   Medication Sig Dispense Refill    venlafaxine (EFFEXOR XR) 150 MG extended release capsule TAKE TWO CAPSULES BY MOUTH DAILY 180 capsule 1    norethindrone-ethinyl estradiol (JUNEL FE 1/20) 1-20 MG-MCG per tablet TAKE 1 TABLET BY MOUTH EVERY DAY 28 tablet 11    Omeprazole Magnesium (PRILOSEC PO) Take by mouth      mirtazapine (REMERON) 30 MG tablet TAKE ONE TABLET BY MOUTH ONCE NIGHTLY 30 tablet 5    valACYclovir (VALTREX) 1 g tablet Take 1 tablet by mouth daily 30 tablet 3    propranolol (INDERAL) 10 MG tablet Take 1 tablet by mouth 4 times daily (Patient not taking: Reported on 3/7/2023) 360 tablet 1     No current facility-administered medications for this visit. ALLERGIES:  Allergies as of 03/07/2023 - Fully Reviewed 03/07/2023   Allergen Reaction Noted    Erythromycin  06/27/2019    Red dye  03/26/2021       Symptoms of decreased mood absent    **If either question is answered in a  positive fashion then complete the PHQ9 Scoring Evaluation and make the appropriate referral**      Immunization status: up to date and documented. Gynecologic History:  Menarche: 13 yo  Menopause at  yo     No LMP recorded. (Menstrual status: Other - See Notes). Sexually Active: No    STD History: No     Permanent Sterilization: No   Reversible Birth Control: Yes junel using it for acne        Hormone Replacement Exposure: No      Genetic Qualified Family History of Breast, Ovarian , Colon or Uterine Cancer: No     If YES see scanned worksheet. Preventative Health Testing:    Health Maintenance:  Health Maintenance Due   Topic Date Due    Cervical cancer screen  Never done    DTaP/Tdap/Td vaccine (2 - Tdap) 02/21/2022       Date of Last Pap Smear: > 10 years ago  Abnormal Pap Smear History: none  Colposcopy History:   Date of Last Mammogram: 2022  Date of Last Colonoscopy:   Date of Last Bone Density:      ________________________________________________________________________        REVIEW OF SYSTEMS:       A minimum of an eleven point review of systems was completed. Review Of Systems (11 point):  Constitutional: No fever, chills or malaise;  No weight change or fatigue  Head and Eyes: No vision changes, Headache, Dizziness or trauma in last 12 months  ENT ROS: No hearing, Tinnitis, sinus or taste problems  Hematological and Lymphatic ROS:No Lymphoma, Von Willebrand's, Hemophillia or Bleeding History  Psych ROS: No Depression, Homicidal thoughts,suicidal thoughts, or anxiety  Breast ROS: No breast abnormalities or lumps  Respiratory ROS: No SOB, Pneumoniae,Cough, or Pulmonary Embolism   Cardiovascular ROS: No Chest Pain with Exertion, Palpitations, Syncope, Edema, Arrhythmia  Gastrointestinal ROS: No Indigestion, Heartburn, Nausea, vomiting, Diarrhea, Constipation,or Bowel Changes; No Bloody Stools or melena  Genito-Urinary ROS: No Dysuria, Hematuria or Nocturia. No Urinary Incontinence or Vaginal Discharge  Musculoskeletal ROS: No Arthralgia, Arthritis,Gout,Osteoporosis or Rheumatism  Neurological ROS: No CVA, Migraines, Epilepsy, Seizure Hx, or Limb Weakness  Dermatological ROS: No Rash, Itching, Hives, Mole Changes or Cancer                                                                                                                                                                                                                                  PHYSICAL Exam:     Constitutional:  Vitals:    03/07/23 1219   BP: 114/82   Weight: 163 lb (73.9 kg)   Height: 5' 7\" (1.702 m)       Chaperone for Intimate Exam  Chaperone was offered and accepted as part of the rooming process. Chaperone: Nicolás Gibson          General Appearance: This  is a well Developed, well Nourished, well groomed female. Her BMI was reviewed. Nutritional decision making was discussed. Skin:  There was a Normal Inspection of the skin without rashes or lesions. There were no rashes. (Papular, Maculopapular, Hives, Pustular, Macular)     There were no lesions (Ulcers, Erythema, Abn. Appearing Nevi)            Lymphatic:  No Lymph Nodes were Palpable in the neck , axilla or groin. # Of Lymph Nodes; Location ; Character [Normal]  [Shotty] [Tender] [Enlarged]     Neck and EENT:  The neck was supple. There were no masses   The thyroid was not enlarged and had no masses. Perrla, EOMI B/L, TMI B/L No Abnormalities. Throat inspected-No exudates or Masses, Nares Patent No Masses        Respiratory: The lungs were auscultated and found to be clear. There were no rales, rhonchi or wheezes. There was a good respiratory effort. Cardiovascular:   The heart was in a regular rate and rhythm. . No S3 or S4. There was no murmur appreciated. Location, grade, and radiation are not applicable. Extremities: The patients extremities were without calf tenderness, edema, or varicosities. There was full range of motion in all four extremities. Pulses in all four extremities were appreciated and are 2/4. Abdomen: The abdomen was soft and non-tender. There were good bowel sounds in all quadrants and there was no guarding, rebound or rigidity. On evaluation there was no evidence of hepatosplenomegaly and there was no costal vertebral nish tenderness bilaterally. No hernias were appreciated. Abdominal Scars: none    Psych: The patient had a normal Orientation to: Time, Place, Person, and Situation  There is no Mood / Affect changes    Breast:  (Chest)  normal appearance, no masses or tenderness  Self breast exams were reviewed in detail. Literature was given. Pelvic Exam:  External genitalia: normal general appearance  Urinary system: urethral meatus normal  Vaginal: normal mucosa without prolapse or lesions  Cervix: normal appearance  Adnexa: normal bimanual exam  Uterus: normal single, nontender    Rectal Exam:  exam declined by patient          Musculosk:  Normal Gait and station was noted. Digits were evaluated without abnormal findings. Range of motion, stability and strength were evaluated and found to be appropriate for the patients age. Counseling Hormonal Based Birth Control:      The patient was seen and counseled on all forms of birth control both male and female  reversible and non. She is aware that hormonal based birth control may increase her risk of developing a blood clot which may increase her morbidity and or mortality. She was counseled on alternate non hormonal based contraception options. We discussed that smoking and any hormonal based contraception may increase the patients risks of developing these life threatening blood clots.  All patients are encouraged to stop smoking at the time of contraceptive counseling. Cessation programs were reviewed. The patient was instructed to use barrier contraception for sexually transmitted disease prevention. The patient was also informed of antibiotics decreasing contraceptive efficacy and the need for barrier contraception from the onset of her antibiotic dosing and through a minimum of thirty days from antibiotic cessation. The life threatening side effect profile was reviewed in detail this includes but is not limited to shortness of breath, chest pain, severe or persistent headaches, or calf pain. If any of these occur the patient has been instructed to stop using her hormonal based contraception, notify the office, and go to the emergency department or call 911. The patient denied any personal history of blood clots in her leg, lung, or heart and denied any family history of stroke, TIA, sudden cardiac death < 36 y.o.,pulmonary embolism, or deep venous thrombosis. ASSESSMENT:      38 y.o. Annual   Diagnosis Orders   1. Well woman exam with routine gynecological exam        2. Encounter for screening mammogram for malignant neoplasm of breast  NADEGE DIGITAL SCREEN W OR WO CAD BILATERAL      3. Visit for gynecologic examination  PAP Smear      4. Screening for human papillomavirus (HPV)  PAP Smear      5.  Perimenopausal symptoms  Follicle Stimulating Hormone    CBC with Auto Differential    TSH with Reflex             Chief Complaint   Patient presents with    Annual Exam          Past Medical History:   Diagnosis Date    Anxiety     Depression     Eczema     Herpes     Stomach ulcer          Patient Active Problem List    Diagnosis Date Noted    Anxiety 04/09/2020    Major depressive disorder with current active episode 04/09/2020    Recurrent genital herpes simplex type 2 infection 04/09/2020    Insomnia 04/09/2020    Eczema 04/09/2020    History of gastric ulcer 04/09/2020 Hereditary Breast, Ovarian, Colon and Uterine Cancer screening Done. Tobacco & Secondary smoke risks reviewed; instructed on cessation and avoidance      Counseling Completed:  Preventative Health Recommendations and Follow up. The patient was informed of the recommended preventative health recommendations. 1. Annuals every year; Cytology collections per prevailing guidelines. 2. Mammograms begin every year at 37 yo if no abnormalities are found and no family history. 3. Bone density studies every 2-3 years. Begin at 73 yo. If no fracture history or osteoporosis family history. (significant). 4. Colonoscopy begin at 38 yo. Repeat every ten years if negative and no family history. 5. Calcium of 5945-5888 mg/day in split dosing  6. Vitamin D 400-800 IU/day  7. All other preventative health recommendations will be managed by the patients Primary care physician. PLAN:  Return in about 1 year (around 3/7/2024) for Annual Exam.  Lab slip given for oligomenorrhea  Samples for [facility] given  Repeat Annual every 1 year  Cervical Cytology Evaluation begins at 24years old. If Negative Cytology, Follow-up screening per current guidelines. Mammograms every 1 year. If 37 yo and last mammogram was negative. Calcium and Vitamin D dosing reviewed. Colonoscopy screening reviewed as well as onset for bone density testing. Birth control and barrier recommendations discussed. STD counseling and prevention reviewed. Gardisil counseling completed for all patients 10-37 yo. Routine health maintenance per patients PCP. Orders Placed This Encounter   Procedures    NADEGE DIGITAL SCREEN W OR WO CAD BILATERAL     Standing Status:   Future     Standing Expiration Date:   9/6/2024     Order Specific Question:   Reason for exam:     Answer:   SCREENING    PAP Smear     Patient History:    No LMP recorded. (Menstrual status: Other - See Notes).   OBGYN Status: Other - See Notes  Past Surgical History:  No date: WISDOM TOOTH EXTRACTION  Medications/Contraceptives Affecting Cytology     Combination Contraceptives - Oral Disp Start End     norethindrone-ethinyl estradiol (JUNEL FE 1/20) 1-20 MG-MCG per tablet    28 tablet 2023     Sig: TAKE 1 TABLET BY MOUTH EVERY DAY        Social History    Tobacco Use      Smoking status: Former        Packs/day: 0.25        Years: 20.00        Pack years: 5        Types: Cigarettes        Start date: 1996        Quit date: 2021        Years since quittin.2      Smokeless tobacco: Never      Tobacco comments: down to 3 cig daily       Standing Status:   Future     Standing Expiration Date:   3/6/2024     Order Specific Question:   Collection Type     Answer: Thin Prep     Order Specific Question:   Prior Abnormal Pap Test     Answer:   No     Order Specific Question:   Screening or Diagnostic     Answer:   Screening     Order Specific Question:   HPV Requested? Answer:   Yes     Order Specific Question:   High Risk Patient     Answer:   N/A    Follicle Stimulating Hormone     Standing Status:   Future     Standing Expiration Date:   3/7/2024    CBC with Auto Differential     Standing Status:   Future     Standing Expiration Date:   3/7/2024    TSH with Reflex     Standing Status:   Future     Standing Expiration Date:   3/7/2024           The patient, Sara Rodríguez is a 38 y.o. female, was seen with a total time spent of 30 minutes for the visit on this date of service by the E/M provider. The time component had both face to face and non face to face time spent in determining the total time component. Counseling and education regarding her diagnosis listed below and her options regarding those diagnoses were also included in determining her time component. Diagnosis Orders   1. Well woman exam with routine gynecological exam        2. Encounter for screening mammogram for malignant neoplasm of breast  NADEGE DIGITAL SCREEN W OR WO CAD BILATERAL      3. Visit for gynecologic examination  PAP Smear      4. Screening for human papillomavirus (HPV)  PAP Smear      5. Perimenopausal symptoms  Follicle Stimulating Hormone    CBC with Auto Differential    TSH with Reflex           The patient had her preventative health maintenance recommendations and follow-up reviewed with her at the completion of her visit.

## 2023-03-09 LAB
HPV SAMPLE: ABNORMAL
HPV, GENOTYPE 16: NOT DETECTED
HPV, GENOTYPE 18: NOT DETECTED
HPV, HIGH RISK OTHER: DETECTED
HPV, INTERPRETATION: ABNORMAL
SPECIMEN DESCRIPTION: ABNORMAL

## 2023-05-18 ENCOUNTER — TELEPHONE (OUTPATIENT)
Dept: OBGYN CLINIC | Age: 43
End: 2023-05-18

## 2023-05-18 NOTE — TELEPHONE ENCOUNTER
Pt was given samples of birthcontrol at last appt ( unsure of name . Jestine Courts Jestine Courts pink box), and would like RX to continue regimen,    *No concerns or questions re: rx    Please send to [facility]/ [place]

## 2023-05-22 ENCOUNTER — TELEPHONE (OUTPATIENT)
Dept: OBGYN CLINIC | Age: 43
End: 2023-05-22

## 2023-05-22 RX ORDER — ACETAMINOPHEN AND CODEINE PHOSPHATE 120; 12 MG/5ML; MG/5ML
1 SOLUTION ORAL DAILY
Qty: 28 TABLET | Refills: 3 | Status: SHIPPED | OUTPATIENT
Start: 2023-05-22 | End: 2023-06-19

## 2023-05-22 NOTE — TELEPHONE ENCOUNTER
Pt called in asking if there is something similar to [facility] that can be called in for her, she has no insurance and it's costing to much , I did give her Copay card number to try to get a discount but wanted to check on a different medication incase it does not have a good cost . Please call into [employer]

## 2023-07-21 ENCOUNTER — HOSPITAL ENCOUNTER (EMERGENCY)
Age: 43
Discharge: HOME | End: 2023-07-21
Attending: EMERGENCY MEDICINE

## 2023-07-21 ENCOUNTER — APPOINTMENT (OUTPATIENT)
Dept: ULTRASOUND IMAGING | Age: 43
End: 2023-07-21

## 2023-07-21 VITALS
WEIGHT: 160 LBS | HEIGHT: 67 IN | OXYGEN SATURATION: 96 % | TEMPERATURE: 98.2 F | SYSTOLIC BLOOD PRESSURE: 119 MMHG | HEART RATE: 99 BPM | DIASTOLIC BLOOD PRESSURE: 86 MMHG | RESPIRATION RATE: 20 BRPM | BODY MASS INDEX: 25.11 KG/M2

## 2023-07-21 DIAGNOSIS — K76.0 HEPATIC STEATOSIS: ICD-10-CM

## 2023-07-21 DIAGNOSIS — N17.9 AKI (ACUTE KIDNEY INJURY) (HCC): ICD-10-CM

## 2023-07-21 DIAGNOSIS — E87.6 HYPOKALEMIA: ICD-10-CM

## 2023-07-21 DIAGNOSIS — R11.2 NAUSEA AND VOMITING, UNSPECIFIED VOMITING TYPE: Primary | ICD-10-CM

## 2023-07-21 LAB
ALBUMIN SERPL-MCNC: 5.5 G/DL (ref 3.5–5.2)
ALP SERPL-CCNC: 114 U/L (ref 35–104)
ALT SERPL-CCNC: 44 U/L (ref 5–33)
ANION GAP SERPL CALCULATED.3IONS-SCNC: 25 MMOL/L (ref 9–17)
AST SERPL-CCNC: 41 U/L
BASOPHILS # BLD: 0.1 K/UL (ref 0–0.2)
BASOPHILS NFR BLD: 1 % (ref 0–2)
BILIRUB SERPL-MCNC: 0.5 MG/DL (ref 0.3–1.2)
BUN SERPL-MCNC: 27 MG/DL (ref 6–20)
CALCIUM SERPL-MCNC: 10.3 MG/DL (ref 8.6–10.4)
CHLORIDE SERPL-SCNC: 92 MMOL/L (ref 98–107)
CO2 SERPL-SCNC: 20 MMOL/L (ref 20–31)
CREAT SERPL-MCNC: 1.8 MG/DL (ref 0.5–0.9)
EOSINOPHIL # BLD: 0 K/UL (ref 0–0.4)
EOSINOPHILS RELATIVE PERCENT: 0 % (ref 0–4)
ERYTHROCYTE [DISTWIDTH] IN BLOOD BY AUTOMATED COUNT: 13.9 % (ref 11.5–14.9)
GFR SERPL CREATININE-BSD FRML MDRD: 35 ML/MIN/1.73M2
GLUCOSE SERPL-MCNC: 140 MG/DL (ref 70–99)
HCT VFR BLD AUTO: 43.9 % (ref 36–46)
HGB BLD-MCNC: 15 G/DL (ref 12–16)
LIPASE SERPL-CCNC: 20 U/L (ref 13–60)
LYMPHOCYTES NFR BLD: 1.4 K/UL (ref 1–4.8)
LYMPHOCYTES RELATIVE PERCENT: 11 % (ref 24–44)
MAGNESIUM SERPL-MCNC: 2 MG/DL (ref 1.6–2.6)
MCH RBC QN AUTO: 31.6 PG (ref 26–34)
MCHC RBC AUTO-ENTMCNC: 34.2 G/DL (ref 31–37)
MCV RBC AUTO: 92.4 FL (ref 80–100)
MONOCYTES NFR BLD: 1.1 K/UL (ref 0.1–1.3)
MONOCYTES NFR BLD: 8 % (ref 1–7)
NEUTROPHILS NFR BLD: 80 % (ref 36–66)
NEUTS SEG NFR BLD: 10.4 K/UL (ref 1.3–9.1)
PLATELET # BLD AUTO: 426 K/UL (ref 150–450)
PMV BLD AUTO: 8.4 FL (ref 6–12)
POTASSIUM SERPL-SCNC: 3 MMOL/L (ref 3.7–5.3)
PROT SERPL-MCNC: 9 G/DL (ref 6.4–8.3)
RBC # BLD AUTO: 4.75 M/UL (ref 4–5.2)
SODIUM SERPL-SCNC: 137 MMOL/L (ref 135–144)
T4 FREE SERPL-MCNC: 2.4 NG/DL (ref 0.9–1.7)
TSH SERPL DL<=0.05 MIU/L-ACNC: 2.49 UIU/ML (ref 0.3–5)
WBC OTHER # BLD: 12.9 K/UL (ref 3.5–11)

## 2023-07-21 PROCEDURE — 96374 THER/PROPH/DIAG INJ IV PUSH: CPT

## 2023-07-21 PROCEDURE — 80053 COMPREHEN METABOLIC PANEL: CPT

## 2023-07-21 PROCEDURE — 84439 ASSAY OF FREE THYROXINE: CPT

## 2023-07-21 PROCEDURE — 96361 HYDRATE IV INFUSION ADD-ON: CPT

## 2023-07-21 PROCEDURE — 83735 ASSAY OF MAGNESIUM: CPT

## 2023-07-21 PROCEDURE — 85027 COMPLETE CBC AUTOMATED: CPT

## 2023-07-21 PROCEDURE — 83690 ASSAY OF LIPASE: CPT

## 2023-07-21 PROCEDURE — 2580000003 HC RX 258: Performed by: EMERGENCY MEDICINE

## 2023-07-21 PROCEDURE — A4216 STERILE WATER/SALINE, 10 ML: HCPCS | Performed by: EMERGENCY MEDICINE

## 2023-07-21 PROCEDURE — 99284 EMERGENCY DEPT VISIT MOD MDM: CPT

## 2023-07-21 PROCEDURE — 84443 ASSAY THYROID STIM HORMONE: CPT

## 2023-07-21 PROCEDURE — C9113 INJ PANTOPRAZOLE SODIUM, VIA: HCPCS | Performed by: EMERGENCY MEDICINE

## 2023-07-21 PROCEDURE — 36415 COLL VENOUS BLD VENIPUNCTURE: CPT

## 2023-07-21 PROCEDURE — 6360000002 HC RX W HCPCS: Performed by: EMERGENCY MEDICINE

## 2023-07-21 PROCEDURE — 76705 ECHO EXAM OF ABDOMEN: CPT

## 2023-07-21 PROCEDURE — 96375 TX/PRO/DX INJ NEW DRUG ADDON: CPT

## 2023-07-21 PROCEDURE — 6370000000 HC RX 637 (ALT 250 FOR IP): Performed by: EMERGENCY MEDICINE

## 2023-07-21 RX ORDER — PROMETHAZINE HYDROCHLORIDE 25 MG/1
25 SUPPOSITORY RECTAL EVERY 6 HOURS PRN
Qty: 10 SUPPOSITORY | Refills: 0 | Status: SHIPPED | OUTPATIENT
Start: 2023-07-21 | End: 2023-07-28

## 2023-07-21 RX ORDER — METOCLOPRAMIDE HYDROCHLORIDE 5 MG/ML
10 INJECTION INTRAMUSCULAR; INTRAVENOUS ONCE
Status: COMPLETED | OUTPATIENT
Start: 2023-07-21 | End: 2023-07-21

## 2023-07-21 RX ORDER — 0.9 % SODIUM CHLORIDE 0.9 %
1000 INTRAVENOUS SOLUTION INTRAVENOUS ONCE
Status: COMPLETED | OUTPATIENT
Start: 2023-07-21 | End: 2023-07-21

## 2023-07-21 RX ORDER — ONDANSETRON 2 MG/ML
4 INJECTION INTRAMUSCULAR; INTRAVENOUS ONCE
Status: COMPLETED | OUTPATIENT
Start: 2023-07-21 | End: 2023-07-21

## 2023-07-21 RX ORDER — POTASSIUM CHLORIDE 20 MEQ/1
40 TABLET, EXTENDED RELEASE ORAL ONCE
Status: COMPLETED | OUTPATIENT
Start: 2023-07-21 | End: 2023-07-21

## 2023-07-21 RX ORDER — POTASSIUM CHLORIDE 7.45 MG/ML
10 INJECTION INTRAVENOUS ONCE
Status: DISCONTINUED | OUTPATIENT
Start: 2023-07-21 | End: 2023-07-21

## 2023-07-21 RX ADMIN — ONDANSETRON 4 MG: 2 INJECTION INTRAMUSCULAR; INTRAVENOUS at 06:15

## 2023-07-21 RX ADMIN — PANTOPRAZOLE SODIUM 40 MG: 40 INJECTION, POWDER, FOR SOLUTION INTRAVENOUS at 06:21

## 2023-07-21 RX ADMIN — SODIUM CHLORIDE 1000 ML: 9 INJECTION, SOLUTION INTRAVENOUS at 06:14

## 2023-07-21 RX ADMIN — METOCLOPRAMIDE 10 MG: 5 INJECTION, SOLUTION INTRAMUSCULAR; INTRAVENOUS at 06:57

## 2023-07-21 RX ADMIN — SODIUM CHLORIDE 1000 ML: 9 INJECTION, SOLUTION INTRAVENOUS at 08:21

## 2023-07-21 RX ADMIN — POTASSIUM CHLORIDE 40 MEQ: 1500 TABLET, EXTENDED RELEASE ORAL at 08:21

## 2023-07-21 ASSESSMENT — ENCOUNTER SYMPTOMS
DIARRHEA: 0
BLOOD IN STOOL: 0
WHEEZING: 0
ABDOMINAL PAIN: 1
SHORTNESS OF BREATH: 0
COLOR CHANGE: 0
TROUBLE SWALLOWING: 0
EYE PAIN: 0
COUGH: 0
EYE REDNESS: 0
RHINORRHEA: 0
SORE THROAT: 0
CHEST TIGHTNESS: 0
CONSTIPATION: 0
VOMITING: 1
BACK PAIN: 0
SINUS PRESSURE: 0
FACIAL SWELLING: 0
NAUSEA: 1
EYE DISCHARGE: 0

## 2023-07-21 ASSESSMENT — LIFESTYLE VARIABLES: HOW OFTEN DO YOU HAVE A DRINK CONTAINING ALCOHOL: NEVER

## 2023-07-21 ASSESSMENT — PAIN SCALES - GENERAL: PAINLEVEL_OUTOF10: 6

## 2023-07-21 ASSESSMENT — PAIN - FUNCTIONAL ASSESSMENT: PAIN_FUNCTIONAL_ASSESSMENT: 0-10

## 2023-07-21 NOTE — ED NOTES
Verbal shift change report given to Sharmin Tamayo RN (oncoming nurse) by Pricilla Mckeon RN (offgoing nurse). Report included the following information ED Encounter Summary, ED SBAR, MAR, Recent Results, and Event Log.        Joceline Carbone RN  07/21/23 [id number]

## 2023-07-21 NOTE — ED TRIAGE NOTES
Mode of arrival (squad #, walk in, police, etc) : Walk-in        Chief complaint(s): Emesis        Arrival Note (brief scenario, treatment PTA, etc). : Pt arrived to ed with c/o emesis with abd pain in the UQ. Pt states this has been going on for 5 days now. C= \"Have you ever felt that you should Cut down on your drinking? \"  No  A= \"Have people Annoyed you by criticizing your drinking? \"  No  G= \"Have you ever felt bad or Guilty about your drinking? \"  No  E= \"Have you ever had a drink as an Eye-opener first thing in the morning to steady your nerves or to help a hangover? \"  No      Deferred []      Reason for deferring: N/A    *If yes to two or more: probable alcohol abuse. *

## 2023-07-21 NOTE — ED PROVIDER NOTES
[address] [facility] ED  eMERGENCY dEPARTMENT eNCOUnter      Pt Name: Bonita Aiken  MRN: [id number]  [address] [facility] [place] 1980  Date of evaluation: 7/21/23      CHIEF COMPLAINT       Chief Complaint   Patient presents with    Emesis    Abdominal Pain         HISTORY OF PRESENT ILLNESS    Bonita Aiken is a 38 y.o. female who presents complaining of vomiting. Patient states for the last 5 days she has been having severe nausea and vomiting. Patient states she had a little bit of epigastric pain but not really too much. Patient denies any diarrhea. Patient's had no fevers. Patient states she just cannot eat or drink anything and she is feeling very dehydrated. Patient denies any medical problems. Patient has had a stomach ulcer in the past.      REVIEW OF SYSTEMS       Review of Systems   Constitutional:  Negative for activity change, appetite change, chills, diaphoresis and fever. HENT:  Negative for congestion, ear pain, facial swelling, nosebleeds, rhinorrhea, sinus pressure, sore throat and trouble swallowing. Eyes:  Negative for pain, discharge and redness. Respiratory:  Negative for cough, chest tightness, shortness of breath and wheezing. Cardiovascular:  Negative for chest pain, palpitations and leg swelling. Gastrointestinal:  Positive for abdominal pain, nausea and vomiting. Negative for blood in stool, constipation and diarrhea. Genitourinary:  Negative for difficulty urinating, dysuria, flank pain, frequency, genital sores and hematuria. Musculoskeletal:  Negative for arthralgias, back pain, gait problem, joint swelling, myalgias and neck pain. Skin:  Negative for color change, pallor, rash and wound. Neurological:  Negative for dizziness, tremors, seizures, syncope, speech difficulty, weakness, numbness and headaches. Psychiatric/Behavioral:  Negative for confusion, decreased concentration, hallucinations, self-injury, sleep disturbance and suicidal ideas.       PAST MEDICAL HISTORY

## 2024-04-16 RX ORDER — DROSPIRENONE 4 MG/1
1 TABLET, FILM COATED ORAL DAILY
Qty: 28 TABLET | Refills: 10 | OUTPATIENT
Start: 2024-04-16

## 2024-05-07 ENCOUNTER — HOSPITAL ENCOUNTER (OUTPATIENT)
Age: 44
Discharge: HOME | End: 2024-05-07

## 2024-05-07 LAB
HBV SURFACE AB SERPL IA-ACNC: 187 MIU/ML
HBV SURFACE AG SERPL QL IA: NONREACTIVE
HCV AB SERPL QL IA: NONREACTIVE
HIV 1+2 AB+HIV1 P24 AG SERPL QL IA: NONREACTIVE

## 2024-05-07 PROCEDURE — 86803 HEPATITIS C AB TEST: CPT

## 2024-05-07 PROCEDURE — 87340 HEPATITIS B SURFACE AG IA: CPT

## 2024-05-07 PROCEDURE — 87389 HIV-1 AG W/HIV-1&-2 AB AG IA: CPT

## 2024-05-07 PROCEDURE — 86317 IMMUNOASSAY INFECTIOUS AGENT: CPT

## 2024-05-07 PROCEDURE — 36415 COLL VENOUS BLD VENIPUNCTURE: CPT

## 2024-06-07 ENCOUNTER — HOSPITAL ENCOUNTER (OUTPATIENT)
Age: 44
Setting detail: SPECIMEN
Discharge: HOME | End: 2024-06-07

## 2024-06-07 DIAGNOSIS — F32.9 MAJOR DEPRESSIVE DISORDER WITH CURRENT ACTIVE EPISODE, UNSPECIFIED DEPRESSION EPISODE SEVERITY, UNSPECIFIED WHETHER RECURRENT: ICD-10-CM

## 2024-06-07 DIAGNOSIS — F41.9 ANXIETY: ICD-10-CM

## 2024-06-07 DIAGNOSIS — Z00.00 ENCOUNTER FOR WELL ADULT EXAM WITHOUT ABNORMAL FINDINGS: ICD-10-CM

## 2024-06-07 DIAGNOSIS — E66.3 OVERWEIGHT (BMI 25.0-29.9): ICD-10-CM

## 2024-06-07 LAB
ALBUMIN SERPL-MCNC: 4.5 G/DL (ref 3.5–5.2)
ALBUMIN/GLOB SERPL: 2 {RATIO} (ref 1–2.5)
ALP SERPL-CCNC: 116 U/L (ref 35–104)
ALT SERPL-CCNC: 24 U/L (ref 10–35)
ANION GAP SERPL CALCULATED.3IONS-SCNC: 11 MMOL/L (ref 9–16)
AST SERPL-CCNC: 26 U/L (ref 10–35)
BASOPHILS # BLD: 0.03 K/UL (ref 0–0.2)
BASOPHILS NFR BLD: 1 % (ref 0–2)
BILIRUB SERPL-MCNC: 0.2 MG/DL (ref 0–1.2)
BUN SERPL-MCNC: 13 MG/DL (ref 6–20)
CALCIUM SERPL-MCNC: 9.1 MG/DL (ref 8.6–10.4)
CHLORIDE SERPL-SCNC: 106 MMOL/L (ref 98–107)
CO2 SERPL-SCNC: 24 MMOL/L (ref 20–31)
CREAT SERPL-MCNC: 0.8 MG/DL (ref 0.5–0.9)
EOSINOPHIL # BLD: 0.05 K/UL (ref 0–0.44)
EOSINOPHILS RELATIVE PERCENT: 1 % (ref 1–4)
ERYTHROCYTE [DISTWIDTH] IN BLOOD BY AUTOMATED COUNT: 14.7 % (ref 11.8–14.4)
GFR, ESTIMATED: >90 ML/MIN/1.73M2
GLUCOSE SERPL-MCNC: 85 MG/DL (ref 74–99)
HCT VFR BLD AUTO: 41 % (ref 36.3–47.1)
HGB BLD-MCNC: 13 G/DL (ref 11.9–15.1)
IMM GRANULOCYTES # BLD AUTO: <0.03 K/UL (ref 0–0.3)
IMM GRANULOCYTES NFR BLD: 0 %
LYMPHOCYTES NFR BLD: 1.98 K/UL (ref 1.1–3.7)
LYMPHOCYTES RELATIVE PERCENT: 34 % (ref 24–43)
MCH RBC QN AUTO: 30.3 PG (ref 25.2–33.5)
MCHC RBC AUTO-ENTMCNC: 31.7 G/DL (ref 28.4–34.8)
MCV RBC AUTO: 95.6 FL (ref 82.6–102.9)
MONOCYTES NFR BLD: 0.42 K/UL (ref 0.1–1.2)
MONOCYTES NFR BLD: 7 % (ref 3–12)
NEUTROPHILS NFR BLD: 57 % (ref 36–65)
NEUTS SEG NFR BLD: 3.28 K/UL (ref 1.5–8.1)
NRBC BLD-RTO: 0 PER 100 WBC
PLATELET # BLD AUTO: 363 K/UL (ref 138–453)
PMV BLD AUTO: 10.3 FL (ref 8.1–13.5)
POTASSIUM SERPL-SCNC: 4.9 MMOL/L (ref 3.7–5.3)
PROT SERPL-MCNC: 7.1 G/DL (ref 6.6–8.7)
RBC # BLD AUTO: 4.29 M/UL (ref 3.95–5.11)
RBC # BLD: ABNORMAL 10*6/UL
SODIUM SERPL-SCNC: 141 MMOL/L (ref 136–145)
TSH SERPL DL<=0.05 MIU/L-ACNC: 1.11 UIU/ML (ref 0.27–4.2)
WBC OTHER # BLD: 5.8 K/UL (ref 3.5–11.3)

## 2024-07-09 ENCOUNTER — OFFICE VISIT (OUTPATIENT)
Dept: FAMILY MEDICINE CLINIC | Age: 44
End: 2024-07-09

## 2024-07-09 VITALS
HEART RATE: 127 BPM | TEMPERATURE: 97.5 F | DIASTOLIC BLOOD PRESSURE: 87 MMHG | SYSTOLIC BLOOD PRESSURE: 136 MMHG | OXYGEN SATURATION: 99 %

## 2024-07-09 DIAGNOSIS — R11.10 INTRACTABLE VOMITING: Primary | ICD-10-CM

## 2024-07-09 DIAGNOSIS — R00.0 TACHYCARDIA: ICD-10-CM

## 2024-07-09 PROCEDURE — 99214 OFFICE O/P EST MOD 30 MIN: CPT

## 2024-07-09 ASSESSMENT — ENCOUNTER SYMPTOMS
CONSTIPATION: 0
EYE ITCHING: 0
DIARRHEA: 0
COUGH: 0
EYE PAIN: 0
VOMITING: 1
EYE REDNESS: 0
ABDOMINAL DISTENTION: 0
ABDOMINAL PAIN: 0

## 2024-07-09 NOTE — PROGRESS NOTES
by private vehicle to [facility] in about 1 week (around 7/16/2024) for evaluation with PCP.    No orders of the defined types were placed in this encounter.        Patient given educational materials - see patient instructions.  Discussed use, benefit, and side effects of prescribed medications.  All patient questions answered.  Pt voiced understanding.    Electronically signed by REJI Candelario NP on 7/9/2024 at 12:18 PM